# Patient Record
Sex: FEMALE | Race: ASIAN | NOT HISPANIC OR LATINO | URBAN - METROPOLITAN AREA
[De-identification: names, ages, dates, MRNs, and addresses within clinical notes are randomized per-mention and may not be internally consistent; named-entity substitution may affect disease eponyms.]

---

## 2017-08-01 ENCOUNTER — OUTPATIENT (OUTPATIENT)
Dept: OUTPATIENT SERVICES | Facility: HOSPITAL | Age: 57
LOS: 1 days | End: 2017-08-01

## 2017-08-29 ENCOUNTER — EMERGENCY (EMERGENCY)
Facility: HOSPITAL | Age: 57
LOS: 1 days | Discharge: HOME | End: 2017-08-29
Attending: EMERGENCY MEDICINE | Admitting: EMERGENCY MEDICINE
Payer: MEDICAID

## 2017-08-29 VITALS
HEART RATE: 84 BPM | OXYGEN SATURATION: 21 % | TEMPERATURE: 98 F | DIASTOLIC BLOOD PRESSURE: 56 MMHG | SYSTOLIC BLOOD PRESSURE: 146 MMHG

## 2017-08-29 VITALS
TEMPERATURE: 98 F | RESPIRATION RATE: 18 BRPM | SYSTOLIC BLOOD PRESSURE: 125 MMHG | HEART RATE: 94 BPM | OXYGEN SATURATION: 98 % | DIASTOLIC BLOOD PRESSURE: 75 MMHG

## 2017-08-29 LAB
ALBUMIN SERPL ELPH-MCNC: 4.2 G/DL — SIGNIFICANT CHANGE UP (ref 3.3–5)
ALP SERPL-CCNC: 93 U/L — SIGNIFICANT CHANGE UP (ref 40–120)
ALT FLD-CCNC: 19 U/L — SIGNIFICANT CHANGE UP (ref 4–33)
APPEARANCE UR: CLEAR — SIGNIFICANT CHANGE UP
AST SERPL-CCNC: 18 U/L — SIGNIFICANT CHANGE UP (ref 4–32)
BACTERIA # UR AUTO: SIGNIFICANT CHANGE UP
BASOPHILS # BLD AUTO: 0.06 K/UL — SIGNIFICANT CHANGE UP (ref 0–0.2)
BASOPHILS NFR BLD AUTO: 0.8 % — SIGNIFICANT CHANGE UP (ref 0–2)
BILIRUB SERPL-MCNC: 0.3 MG/DL — SIGNIFICANT CHANGE UP (ref 0.2–1.2)
BILIRUB UR-MCNC: NEGATIVE — SIGNIFICANT CHANGE UP
BLOOD UR QL VISUAL: NEGATIVE — SIGNIFICANT CHANGE UP
BUN SERPL-MCNC: 13 MG/DL — SIGNIFICANT CHANGE UP (ref 7–23)
CALCIUM SERPL-MCNC: 10.1 MG/DL — SIGNIFICANT CHANGE UP (ref 8.4–10.5)
CHLORIDE SERPL-SCNC: 99 MMOL/L — SIGNIFICANT CHANGE UP (ref 98–107)
CO2 SERPL-SCNC: 25 MMOL/L — SIGNIFICANT CHANGE UP (ref 22–31)
COLOR SPEC: SIGNIFICANT CHANGE UP
CREAT SERPL-MCNC: 0.9 MG/DL — SIGNIFICANT CHANGE UP (ref 0.5–1.3)
EOSINOPHIL # BLD AUTO: 0.11 K/UL — SIGNIFICANT CHANGE UP (ref 0–0.5)
EOSINOPHIL NFR BLD AUTO: 1.5 % — SIGNIFICANT CHANGE UP (ref 0–6)
GLUCOSE SERPL-MCNC: 145 MG/DL — HIGH (ref 70–99)
GLUCOSE UR-MCNC: NEGATIVE — SIGNIFICANT CHANGE UP
HCT VFR BLD CALC: 42.2 % — SIGNIFICANT CHANGE UP (ref 34.5–45)
HGB BLD-MCNC: 13.2 G/DL — SIGNIFICANT CHANGE UP (ref 11.5–15.5)
HYALINE CASTS # UR AUTO: SIGNIFICANT CHANGE UP (ref 0–?)
IMM GRANULOCYTES # BLD AUTO: 0.02 # — SIGNIFICANT CHANGE UP
IMM GRANULOCYTES NFR BLD AUTO: 0.3 % — SIGNIFICANT CHANGE UP (ref 0–1.5)
KETONES UR-MCNC: NEGATIVE — SIGNIFICANT CHANGE UP
LEUKOCYTE ESTERASE UR-ACNC: HIGH
LIDOCAIN IGE QN: 22.5 U/L — SIGNIFICANT CHANGE UP (ref 7–60)
LYMPHOCYTES # BLD AUTO: 1.65 K/UL — SIGNIFICANT CHANGE UP (ref 1–3.3)
LYMPHOCYTES # BLD AUTO: 22.3 % — SIGNIFICANT CHANGE UP (ref 13–44)
MCHC RBC-ENTMCNC: 26.4 PG — LOW (ref 27–34)
MCHC RBC-ENTMCNC: 31.3 % — LOW (ref 32–36)
MCV RBC AUTO: 84.4 FL — SIGNIFICANT CHANGE UP (ref 80–100)
MONOCYTES # BLD AUTO: 0.45 K/UL — SIGNIFICANT CHANGE UP (ref 0–0.9)
MONOCYTES NFR BLD AUTO: 6.1 % — SIGNIFICANT CHANGE UP (ref 2–14)
MUCOUS THREADS # UR AUTO: SIGNIFICANT CHANGE UP
NEUTROPHILS # BLD AUTO: 5.12 K/UL — SIGNIFICANT CHANGE UP (ref 1.8–7.4)
NEUTROPHILS NFR BLD AUTO: 69 % — SIGNIFICANT CHANGE UP (ref 43–77)
NITRITE UR-MCNC: NEGATIVE — SIGNIFICANT CHANGE UP
NRBC # FLD: 0 — SIGNIFICANT CHANGE UP
PH UR: 6.5 — SIGNIFICANT CHANGE UP (ref 4.6–8)
PLATELET # BLD AUTO: 239 K/UL — SIGNIFICANT CHANGE UP (ref 150–400)
PMV BLD: 10.6 FL — SIGNIFICANT CHANGE UP (ref 7–13)
POTASSIUM SERPL-MCNC: 4.4 MMOL/L — SIGNIFICANT CHANGE UP (ref 3.5–5.3)
POTASSIUM SERPL-SCNC: 4.4 MMOL/L — SIGNIFICANT CHANGE UP (ref 3.5–5.3)
PROT SERPL-MCNC: 7.6 G/DL — SIGNIFICANT CHANGE UP (ref 6–8.3)
PROT UR-MCNC: NEGATIVE — SIGNIFICANT CHANGE UP
RBC # BLD: 5 M/UL — SIGNIFICANT CHANGE UP (ref 3.8–5.2)
RBC # FLD: 15.6 % — HIGH (ref 10.3–14.5)
RBC CASTS # UR COMP ASSIST: SIGNIFICANT CHANGE UP (ref 0–?)
SODIUM SERPL-SCNC: 140 MMOL/L — SIGNIFICANT CHANGE UP (ref 135–145)
SP GR SPEC: 1.01 — SIGNIFICANT CHANGE UP (ref 1–1.03)
SQUAMOUS # UR AUTO: SIGNIFICANT CHANGE UP
UROBILINOGEN FLD QL: NORMAL E.U. — SIGNIFICANT CHANGE UP (ref 0.1–0.2)
WBC # BLD: 7.41 K/UL — SIGNIFICANT CHANGE UP (ref 3.8–10.5)
WBC # FLD AUTO: 7.41 K/UL — SIGNIFICANT CHANGE UP (ref 3.8–10.5)
WBC UR QL: SIGNIFICANT CHANGE UP (ref 0–?)

## 2017-08-29 PROCEDURE — 99283 EMERGENCY DEPT VISIT LOW MDM: CPT | Mod: 25

## 2017-08-29 PROCEDURE — 72100 X-RAY EXAM L-S SPINE 2/3 VWS: CPT | Mod: 26

## 2017-08-29 RX ORDER — SODIUM CHLORIDE 9 MG/ML
1000 INJECTION INTRAMUSCULAR; INTRAVENOUS; SUBCUTANEOUS ONCE
Qty: 0 | Refills: 0 | Status: COMPLETED | OUTPATIENT
Start: 2017-08-29 | End: 2017-08-29

## 2017-08-29 RX ORDER — CEPHALEXIN 500 MG
1 CAPSULE ORAL
Qty: 14 | Refills: 0 | OUTPATIENT
Start: 2017-08-29 | End: 2017-09-05

## 2017-08-29 RX ORDER — METOCLOPRAMIDE HCL 10 MG
10 TABLET ORAL ONCE
Qty: 0 | Refills: 0 | Status: COMPLETED | OUTPATIENT
Start: 2017-08-29 | End: 2017-08-29

## 2017-08-29 RX ORDER — ACETAMINOPHEN 500 MG
650 TABLET ORAL ONCE
Qty: 0 | Refills: 0 | Status: COMPLETED | OUTPATIENT
Start: 2017-08-29 | End: 2017-08-29

## 2017-08-29 RX ORDER — KETOROLAC TROMETHAMINE 30 MG/ML
15 SYRINGE (ML) INJECTION ONCE
Qty: 0 | Refills: 0 | Status: DISCONTINUED | OUTPATIENT
Start: 2017-08-29 | End: 2017-08-29

## 2017-08-29 RX ADMIN — SODIUM CHLORIDE 1000 MILLILITER(S): 9 INJECTION INTRAMUSCULAR; INTRAVENOUS; SUBCUTANEOUS at 07:28

## 2017-08-29 RX ADMIN — Medication 650 MILLIGRAM(S): at 10:59

## 2017-08-29 RX ADMIN — Medication 15 MILLIGRAM(S): at 07:28

## 2017-08-29 RX ADMIN — Medication 10 MILLIGRAM(S): at 07:28

## 2017-08-29 NOTE — ED PROVIDER NOTE - PROGRESS NOTE DETAILS
Patient has had improvement in both headache and back pain. Indicates still having mild HA. Will treat with one more dose of Tylenol before discharging. Urine positive for UTI. Will treat as outpatient. Patient to follow up with PCP and PT.

## 2017-08-29 NOTE — ED PROVIDER NOTE - CHIEF COMPLAINT
The patient is a 57y Female complaining of multiple medical complaints. The patient is a 57y Female complaining of headache and back pain.

## 2017-08-29 NOTE — ED ADULT NURSE NOTE - OBJECTIVE STATEMENT
alert   c/o frontal headache and low back pain   x 3 days   states that back pain is making it difficult to breath    denies any injury to back and no prior back pain

## 2017-08-29 NOTE — ED PROVIDER NOTE - CARE PLAN
Principal Discharge DX:	Headache  Instructions for follow-up, activity and diet:	Please take your antibiotics to treat the UTI as discussed. Please take Tylenol and/or Motrin for back pain and headache. Take no more than recommended on the bottle. Please follow up as soon as possible with your primary care doctor and a physical therapist. Please return if having worsening headache or severe back pain, or for any neurological symptoms such as dizziness, weakness, or changes in vision.  Secondary Diagnosis:	Back pain  Secondary Diagnosis:	UTI (urinary tract infection)

## 2017-08-29 NOTE — ED PROVIDER NOTE - OBJECTIVE STATEMENT
58 y/o F with h/o HTN, DM, hypothyroid presents with complaint of headache and lower back pain. Headache began 3 days ago and was gradual in onset. Minimal relieve with tylenol. Denies any neck pain/stiffness. Tmax of 100F at home. Has not taken any medications today. Headache is bilateral and associated with nausea. Had one episode of vomiting today. Denies any visual changes, focal deficits. Lower back pain has been going on for approximately the same amount of time. Patient's son is getting  this week and patient has been working hard cleaning around the house.

## 2017-08-29 NOTE — ED PROVIDER NOTE - MEDICAL DECISION MAKING DETAILS
56 y/o F with h/o DM, hypothyroid and HTN presents with complaint of headache and back pain. Increased activity recently secondary to son's wedding. afebrile, neck supple. No meningeal signs. Does have midline spinous process tenderness, will do xray lumbar spine to evaluate

## 2017-08-29 NOTE — ED PROVIDER NOTE - PLAN OF CARE
Please take your antibiotics to treat the UTI as discussed. Please take Tylenol and/or Motrin for back pain and headache. Take no more than recommended on the bottle. Please follow up as soon as possible with your primary care doctor and a physical therapist. Please return if having worsening headache or severe back pain, or for any neurological symptoms such as dizziness, weakness, or changes in vision.

## 2017-08-29 NOTE — ED PROVIDER NOTE - SHIFT CHANGE DETAILS
[] Labs, reassess after medications  [] If Sx improved f/u PCP/ortho outp for HA and LBP management  STEFAN

## 2017-08-29 NOTE — ED PROVIDER NOTE - ATTENDING CONTRIBUTION TO CARE
I was physically present for the E/M service provided. I agree with above history, physical, and plan which I have reviewed and edited where appropriate. I was physically present for the key portions of the service provided.    57F h/o DM, HTN, hypothyroid p/w 3 days of gradual onset constant headache. Subjective fever at home today. Also LBP x3 days without LE weakness no bowel or bladder incontinence. No URI Sx. No vision changes. No focal deficits. No photophobia. +Nausea. No relief with Tylenol.  -NAD, Neurologic exam: A&O x3, speech clear, CELI, CN II-XII intact, motor strength +5/5 in all extremities, sensation equal bilaterally, finger-to-nose normal, neck supple  -IVF, reglan, Toradol reassess I was physically present for the E/M service provided. I agree with above history, physical, and plan which I have reviewed and edited where appropriate. I was physically present for the key portions of the service provided.    57F h/o DM, HTN, hypothyroid p/w 3 days of gradual onset constant headache. Subjective fever at home today, none in ED. Also LBP x3 days without LE weakness no bowel or bladder incontinence. No URI Sx. No vision changes. No focal deficits. No photophobia. +Nausea. No relief with Tylenol. Pt has been hosting a 3 day wedding celebration for her son and has been working extremely hard per family.  -NAD, Neurologic exam: A&O x3, speech clear, CELI, CN II-XII intact, motor strength +5/5 in all extremities, sensation equal bilaterally, finger-to-nose normal, neck supple, abd soft NTND  -HA: IVF, Reglan, Toradol, reassess HA Sx, no focal deficit  -LBP: XR L-spine neg for acute process, no cauda equina warning sign son exam, will need PT f/u outpt

## 2017-08-30 LAB — SPECIMEN SOURCE: SIGNIFICANT CHANGE UP

## 2017-08-31 DIAGNOSIS — R69 ILLNESS, UNSPECIFIED: ICD-10-CM

## 2017-08-31 LAB — BACTERIA UR CULT: SIGNIFICANT CHANGE UP

## 2018-12-17 PROBLEM — E11.9 TYPE 2 DIABETES MELLITUS WITHOUT COMPLICATIONS: Chronic | Status: ACTIVE | Noted: 2017-08-29

## 2018-12-17 PROBLEM — I10 ESSENTIAL (PRIMARY) HYPERTENSION: Chronic | Status: ACTIVE | Noted: 2017-08-29

## 2018-12-17 PROBLEM — E03.9 HYPOTHYROIDISM, UNSPECIFIED: Chronic | Status: ACTIVE | Noted: 2017-08-29

## 2018-12-27 ENCOUNTER — OUTPATIENT (OUTPATIENT)
Dept: OUTPATIENT SERVICES | Facility: HOSPITAL | Age: 58
LOS: 1 days | End: 2018-12-27
Payer: MEDICAID

## 2018-12-27 VITALS
HEIGHT: 64 IN | DIASTOLIC BLOOD PRESSURE: 74 MMHG | TEMPERATURE: 98 F | OXYGEN SATURATION: 96 % | SYSTOLIC BLOOD PRESSURE: 131 MMHG | RESPIRATION RATE: 16 BRPM | WEIGHT: 199.96 LBS | HEART RATE: 66 BPM

## 2018-12-27 DIAGNOSIS — R94.39 ABNORMAL RESULT OF OTHER CARDIOVASCULAR FUNCTION STUDY: ICD-10-CM

## 2018-12-27 DIAGNOSIS — Z98.890 OTHER SPECIFIED POSTPROCEDURAL STATES: Chronic | ICD-10-CM

## 2018-12-27 LAB
ALBUMIN SERPL ELPH-MCNC: 4.4 G/DL — SIGNIFICANT CHANGE UP (ref 3.3–5)
ALP SERPL-CCNC: 102 U/L — SIGNIFICANT CHANGE UP (ref 40–120)
ALT FLD-CCNC: 16 U/L — SIGNIFICANT CHANGE UP (ref 10–45)
ANION GAP SERPL CALC-SCNC: 11 MMOL/L — SIGNIFICANT CHANGE UP (ref 5–17)
AST SERPL-CCNC: 14 U/L — SIGNIFICANT CHANGE UP (ref 10–40)
BILIRUB SERPL-MCNC: 0.3 MG/DL — SIGNIFICANT CHANGE UP (ref 0.2–1.2)
BUN SERPL-MCNC: 13 MG/DL — SIGNIFICANT CHANGE UP (ref 7–23)
CALCIUM SERPL-MCNC: 9.7 MG/DL — SIGNIFICANT CHANGE UP (ref 8.4–10.5)
CHLORIDE SERPL-SCNC: 103 MMOL/L — SIGNIFICANT CHANGE UP (ref 96–108)
CO2 SERPL-SCNC: 26 MMOL/L — SIGNIFICANT CHANGE UP (ref 22–31)
CREAT SERPL-MCNC: 0.81 MG/DL — SIGNIFICANT CHANGE UP (ref 0.5–1.3)
GLUCOSE SERPL-MCNC: 139 MG/DL — HIGH (ref 70–99)
HCT VFR BLD CALC: 38.1 % — SIGNIFICANT CHANGE UP (ref 34.5–45)
HGB BLD-MCNC: 12.6 G/DL — SIGNIFICANT CHANGE UP (ref 11.5–15.5)
MCHC RBC-ENTMCNC: 27.3 PG — SIGNIFICANT CHANGE UP (ref 27–34)
MCHC RBC-ENTMCNC: 33.1 GM/DL — SIGNIFICANT CHANGE UP (ref 32–36)
MCV RBC AUTO: 82.4 FL — SIGNIFICANT CHANGE UP (ref 80–100)
PLATELET # BLD AUTO: 234 K/UL — SIGNIFICANT CHANGE UP (ref 150–400)
POTASSIUM SERPL-MCNC: 4.4 MMOL/L — SIGNIFICANT CHANGE UP (ref 3.5–5.3)
POTASSIUM SERPL-SCNC: 4.4 MMOL/L — SIGNIFICANT CHANGE UP (ref 3.5–5.3)
PROT SERPL-MCNC: 7.4 G/DL — SIGNIFICANT CHANGE UP (ref 6–8.3)
RBC # BLD: 4.62 M/UL — SIGNIFICANT CHANGE UP (ref 3.8–5.2)
RBC # FLD: 13.8 % — SIGNIFICANT CHANGE UP (ref 10.3–14.5)
SODIUM SERPL-SCNC: 140 MMOL/L — SIGNIFICANT CHANGE UP (ref 135–145)
WBC # BLD: 6.8 K/UL — SIGNIFICANT CHANGE UP (ref 3.8–10.5)
WBC # FLD AUTO: 6.8 K/UL — SIGNIFICANT CHANGE UP (ref 3.8–10.5)

## 2018-12-27 PROCEDURE — C1894: CPT

## 2018-12-27 PROCEDURE — 85027 COMPLETE CBC AUTOMATED: CPT

## 2018-12-27 PROCEDURE — 93458 L HRT ARTERY/VENTRICLE ANGIO: CPT

## 2018-12-27 PROCEDURE — 93005 ELECTROCARDIOGRAM TRACING: CPT

## 2018-12-27 PROCEDURE — 99152 MOD SED SAME PHYS/QHP 5/>YRS: CPT

## 2018-12-27 PROCEDURE — C1769: CPT

## 2018-12-27 PROCEDURE — 99203 OFFICE O/P NEW LOW 30 MIN: CPT

## 2018-12-27 PROCEDURE — 93458 L HRT ARTERY/VENTRICLE ANGIO: CPT | Mod: 26,GC

## 2018-12-27 PROCEDURE — C1887: CPT

## 2018-12-27 PROCEDURE — 80053 COMPREHEN METABOLIC PANEL: CPT

## 2018-12-27 PROCEDURE — 99152 MOD SED SAME PHYS/QHP 5/>YRS: CPT | Mod: GC

## 2018-12-27 PROCEDURE — 93010 ELECTROCARDIOGRAM REPORT: CPT

## 2018-12-27 RX ORDER — SODIUM CHLORIDE 9 MG/ML
3 INJECTION INTRAMUSCULAR; INTRAVENOUS; SUBCUTANEOUS EVERY 8 HOURS
Qty: 0 | Refills: 0 | Status: DISCONTINUED | OUTPATIENT
Start: 2018-12-27 | End: 2019-01-11

## 2018-12-27 NOTE — H&P CARDIOLOGY - HISTORY OF PRESENT ILLNESS
58 year old [place] female with PMH of 58 year old [place] female with PMH of non occlusive carotid artery disease,  HTN, HLD, hypothyroidism, DMT2-well controlled, without complication and followed by Dr Sarkar PCP, and obesity presents today for cardiac  catheterization. Patient reports she went for routine check up and was referred to Dr Shaik White who performed NST revealing inferoseptal ischemia. EF 78%. Patient denies CP or DAVILA.  Referred today for angiogram.

## 2018-12-27 NOTE — H&P CARDIOLOGY - PMH
Diabetes mellitus    HLD (hyperlipidemia)    Hypertension    Hypothyroid Carotid stenosis    Diabetes mellitus    HLD (hyperlipidemia)    Hypertension    Hypothyroid

## 2021-04-22 ENCOUNTER — EMERGENCY (EMERGENCY)
Facility: HOSPITAL | Age: 61
LOS: 1 days | Discharge: HOME | End: 2021-04-22
Attending: EMERGENCY MEDICINE | Admitting: EMERGENCY MEDICINE
Payer: MEDICAID

## 2021-04-22 VITALS
RESPIRATION RATE: 16 BRPM | HEART RATE: 82 BPM | SYSTOLIC BLOOD PRESSURE: 156 MMHG | TEMPERATURE: 97 F | DIASTOLIC BLOOD PRESSURE: 80 MMHG | OXYGEN SATURATION: 98 %

## 2021-04-22 DIAGNOSIS — Z98.890 OTHER SPECIFIED POSTPROCEDURAL STATES: Chronic | ICD-10-CM

## 2021-04-22 LAB
ALBUMIN SERPL ELPH-MCNC: 3.7 G/DL — SIGNIFICANT CHANGE UP (ref 3.3–5)
ALP SERPL-CCNC: 107 U/L — SIGNIFICANT CHANGE UP (ref 40–120)
ALT FLD-CCNC: 20 U/L — SIGNIFICANT CHANGE UP (ref 12–78)
ANION GAP SERPL CALC-SCNC: 7 MMOL/L — SIGNIFICANT CHANGE UP (ref 5–17)
APTT BLD: 30.5 SEC — SIGNIFICANT CHANGE UP (ref 27.5–35.5)
AST SERPL-CCNC: 12 U/L — LOW (ref 15–37)
BASOPHILS # BLD AUTO: 0.07 K/UL — SIGNIFICANT CHANGE UP (ref 0–0.2)
BASOPHILS NFR BLD AUTO: 0.7 % — SIGNIFICANT CHANGE UP (ref 0–2)
BILIRUB SERPL-MCNC: 0.3 MG/DL — SIGNIFICANT CHANGE UP (ref 0.2–1.2)
BUN SERPL-MCNC: 19 MG/DL — SIGNIFICANT CHANGE UP (ref 7–23)
CALCIUM SERPL-MCNC: 9.4 MG/DL — SIGNIFICANT CHANGE UP (ref 8.5–10.1)
CHLORIDE SERPL-SCNC: 105 MMOL/L — SIGNIFICANT CHANGE UP (ref 96–108)
CO2 SERPL-SCNC: 28 MMOL/L — SIGNIFICANT CHANGE UP (ref 22–31)
CREAT SERPL-MCNC: 1 MG/DL — SIGNIFICANT CHANGE UP (ref 0.5–1.3)
EOSINOPHIL # BLD AUTO: 0.25 K/UL — SIGNIFICANT CHANGE UP (ref 0–0.5)
EOSINOPHIL NFR BLD AUTO: 2.6 % — SIGNIFICANT CHANGE UP (ref 0–6)
GLUCOSE SERPL-MCNC: 140 MG/DL — HIGH (ref 70–99)
HCT VFR BLD CALC: 41.3 % — SIGNIFICANT CHANGE UP (ref 34.5–45)
HGB BLD-MCNC: 12.7 G/DL — SIGNIFICANT CHANGE UP (ref 11.5–15.5)
IMM GRANULOCYTES NFR BLD AUTO: 0.4 % — SIGNIFICANT CHANGE UP (ref 0–1.5)
INR BLD: 1.13 RATIO — SIGNIFICANT CHANGE UP (ref 0.88–1.16)
LYMPHOCYTES # BLD AUTO: 2.73 K/UL — SIGNIFICANT CHANGE UP (ref 1–3.3)
LYMPHOCYTES # BLD AUTO: 28.7 % — SIGNIFICANT CHANGE UP (ref 13–44)
MCHC RBC-ENTMCNC: 23.8 PG — LOW (ref 27–34)
MCHC RBC-ENTMCNC: 30.8 GM/DL — LOW (ref 32–36)
MCV RBC AUTO: 77.5 FL — LOW (ref 80–100)
MONOCYTES # BLD AUTO: 0.61 K/UL — SIGNIFICANT CHANGE UP (ref 0–0.9)
MONOCYTES NFR BLD AUTO: 6.4 % — SIGNIFICANT CHANGE UP (ref 2–14)
NEUTROPHILS # BLD AUTO: 5.8 K/UL — SIGNIFICANT CHANGE UP (ref 1.8–7.4)
NEUTROPHILS NFR BLD AUTO: 61.2 % — SIGNIFICANT CHANGE UP (ref 43–77)
NRBC # BLD: 0 /100 WBCS — SIGNIFICANT CHANGE UP (ref 0–0)
PLATELET # BLD AUTO: 258 K/UL — SIGNIFICANT CHANGE UP (ref 150–400)
POTASSIUM SERPL-MCNC: 4.3 MMOL/L — SIGNIFICANT CHANGE UP (ref 3.5–5.3)
POTASSIUM SERPL-SCNC: 4.3 MMOL/L — SIGNIFICANT CHANGE UP (ref 3.5–5.3)
PROT SERPL-MCNC: 7.9 G/DL — SIGNIFICANT CHANGE UP (ref 6–8.3)
PROTHROM AB SERPL-ACNC: 13.1 SEC — SIGNIFICANT CHANGE UP (ref 10.6–13.6)
RBC # BLD: 5.33 M/UL — HIGH (ref 3.8–5.2)
RBC # FLD: 19.3 % — HIGH (ref 10.3–14.5)
SODIUM SERPL-SCNC: 140 MMOL/L — SIGNIFICANT CHANGE UP (ref 135–145)
TROPONIN I SERPL-MCNC: <.015 NG/ML — SIGNIFICANT CHANGE UP (ref 0.01–0.04)
WBC # BLD: 9.5 K/UL — SIGNIFICANT CHANGE UP (ref 3.8–10.5)
WBC # FLD AUTO: 9.5 K/UL — SIGNIFICANT CHANGE UP (ref 3.8–10.5)

## 2021-04-22 PROCEDURE — 99285 EMERGENCY DEPT VISIT HI MDM: CPT

## 2021-04-22 PROCEDURE — 93010 ELECTROCARDIOGRAM REPORT: CPT

## 2021-04-22 PROCEDURE — 70450 CT HEAD/BRAIN W/O DYE: CPT | Mod: 26,MA

## 2021-04-22 PROCEDURE — 71045 X-RAY EXAM CHEST 1 VIEW: CPT | Mod: 26

## 2021-04-22 RX ORDER — SODIUM CHLORIDE 9 MG/ML
1000 INJECTION INTRAMUSCULAR; INTRAVENOUS; SUBCUTANEOUS ONCE
Refills: 0 | Status: COMPLETED | OUTPATIENT
Start: 2021-04-22 | End: 2021-04-22

## 2021-04-22 RX ADMIN — SODIUM CHLORIDE 1000 MILLILITER(S): 9 INJECTION INTRAMUSCULAR; INTRAVENOUS; SUBCUTANEOUS at 23:59

## 2021-04-22 RX ADMIN — SODIUM CHLORIDE 1000 MILLILITER(S): 9 INJECTION INTRAMUSCULAR; INTRAVENOUS; SUBCUTANEOUS at 22:19

## 2021-04-22 NOTE — ED PROVIDER NOTE - ATTENDING CONTRIBUTION TO CARE
59yo female bib ems s/p syncopal episode, pt was fasting but ate, and got dizzy and lightheaded and passed out hit her head, no chest pain no other complaints  exam: neuro intact, lungs CTA, heart RRR  plan: ekg, labs, xr, ct  agree with assessment and plan of PA

## 2021-04-22 NOTE — ED ADULT NURSE NOTE - NSIMPLEMENTINTERV_GEN_ALL_ED
Implemented All Fall Risk Interventions:  [place] to call system. Call bell, personal items and telephone within reach. Instruct patient to call for assistance. Room bathroom lighting operational. Non-slip footwear when patient is off stretcher. Physically safe environment: no spills, clutter or unnecessary equipment. Stretcher in lowest position, wheels locked, appropriate side rails in place. Provide visual cue, wrist band, yellow gown, etc. Monitor gait and stability. Monitor for mental status changes and reorient to person, place, and time. Review medications for side effects contributing to fall risk. Reinforce activity limits and safety measures with patient and family.

## 2021-04-22 NOTE — ED PROVIDER NOTE - PROGRESS NOTE DETAILS
lab no acute findings lab no acute findings ct head wnl pt feeling better normal gait advised f/u with pcp and cardio stable for d/c

## 2021-04-22 NOTE — ED ADULT NURSE NOTE - HOW OFTEN DO YOU HAVE A DRINK CONTAINING ALCOHOL?
Hpi Title: Evaluation of Skin Lesions
How Severe Are Your Spot(S)?: mild
Have Your Spot(S) Been Treated In The Past?: has not been treated
Never

## 2021-04-22 NOTE — ED PROVIDER NOTE - CARE PROVIDER_API CALL
Joey Caputo)  Cardiovascular Disease; Internal Medicine  [address], [place] [id number]  Phone: [phone]  Fax: [phone]  Follow Up Time:     Leyla Shepherd)  Internal Medicine  [address] [place] [address]  Phone: [phone]  Fax: [phone]  Follow Up Time:

## 2021-04-22 NOTE — ED ADULT NURSE NOTE - BREATH SOUNDS, LEFT
[FreeTextEntry1] : This note was authored by Amaya Tejeda working as scribe for Dr. Nolan Lay. I, Dr. Nolan Lay, have reviewed the content of this note and confirm it is true and accurate. I personally performed the history and physical examination and made all the decisions.\par 07/14/2020 
clear

## 2021-04-22 NOTE — ED PROVIDER NOTE - PATIENT PORTAL LINK FT
You can access the FollowMyHealth Patient Portal offered by [facility] by registering at the following website: [email or web address]. By joining [facility]’s FollowMyHealth portal, you will also be able to view your health information using other applications (apps) compatible with our system.

## 2021-04-22 NOTE — ED PROVIDER NOTE - OBJECTIVE STATEMENT
Pt is a 59 yo female with pmhx of DM HTN HLD BIBEMS for syncope. Pt states she is on 10th day of fasting, she broke her fast today took her regular medications. Pt was feeding her grand [place] started to feel dizzy and see black. Pt's son found pt floor no chest pain no sob no headache. Pt does not recall what happened. Pt denies any numbness tingling weakness fever chills cough. Pt c/o of generalized weakness and feeling tired.     pcp Leyla Marin

## 2021-04-22 NOTE — ED PROVIDER NOTE - NSFOLLOWUPINSTRUCTIONS_ED_ALL_ED_FT
Please follow up with pcp and cardiologist   return to er for any worsening symptoms     Syncope    Syncope is when you pass out (faint) for a short time. It is caused by a sudden decrease in blood flow to the brain. Signs that you may be about to pass out include:  •Feeling dizzy or light-headed.      •Feeling sick to your stomach (nauseous).      •Seeing all white or all black.      •Having cold, clammy skin.      If you pass out, get help right away. Call your local emergency services (911 in the [place] Do not drive yourself to the hospital.      Follow these instructions at home:    Watch for any changes in your symptoms. Take these actions to stay safe and help with your symptoms:    Lifestyle     • Do not drive, use machinery, or play sports until your doctor says it is okay.      • Do not drink alcohol.      • Do not use any products that contain nicotine or tobacco, such as cigarettes and e-cigarettes. If you need help quitting, ask your doctor.      •Drink enough fluid to keep your pee (urine) pale yellow.      General instructions     •Take over-the-counter and prescription medicines only as told by your doctor.      •If you are taking blood pressure or heart medicine, sit up and stand up slowly. Spend a few minutes getting ready to sit and then stand. This can help you feel less dizzy.      •Have someone stay with you until you feel stable.      •If you start to feel like you might pass out, lie down right away and raise (elevate) your feet above the level of your heart. Breathe deeply and steadily. Wait until all of the symptoms are gone.      •Keep all follow-up visits as told by your doctor. This is important.        Get help right away if:    •You have a very bad headache.      •You pass out once or more than once.      •You have pain in your chest, belly, or back.      •You have a very fast or uneven heartbeat (palpitations).      •It hurts to breathe.      •You are bleeding from your mouth or your bottom (rectum).      •You have black or tarry poop (stool).      •You have jerky movements that you cannot control (seizure).      •You are confused.      •You have trouble walking.      •You are very weak.      •You have vision problems.      These symptoms may be an emergency. Do not wait to see if the symptoms will go away. Get medical help right away. Call your local emergency services (911 in the [place] Do not drive yourself to the hospital.       Summary    •Syncope is when you pass out (faint) for a short time. It is caused by a sudden decrease in blood flow to the brain.      •Signs that you may be about to faint include feeling dizzy, light-headed, or sick to your stomach, seeing all white or all black, or having cold, clammy skin.      •If you start to feel like you might pass out, lie down right away and raise (elevate) your feet above the level of your heart. Breathe deeply and steadily. Wait until all of the symptoms are gone.      This information is not intended to replace advice given to you by your health care provider. Make sure you discuss any questions you have with your health care provider.

## 2021-04-22 NOTE — ED ADULT NURSE NOTE - OBJECTIVE STATEMENT
patient a/o x 4 with a calm affect states she felt dizzy at home and had a syncopal episode, witnessed by daughter in law.  patient denies chest pain or even generalized pain from a fall.  patient denies dizziness now, but feels weak.  pending initial labs, EKG completed, pending CT

## 2021-04-23 VITALS
OXYGEN SATURATION: 99 % | TEMPERATURE: 98 F | DIASTOLIC BLOOD PRESSURE: 76 MMHG | SYSTOLIC BLOOD PRESSURE: 140 MMHG | HEART RATE: 86 BPM | RESPIRATION RATE: 16 BRPM

## 2021-04-24 ENCOUNTER — INPATIENT (INPATIENT)
Facility: HOSPITAL | Age: 61
LOS: 1 days | Discharge: HOME | DRG: 313 | End: 2021-04-26
Attending: INTERNAL MEDICINE | Admitting: STUDENT IN AN ORGANIZED HEALTH CARE EDUCATION/TRAINING PROGRAM
Payer: MEDICAID

## 2021-04-24 VITALS
WEIGHT: 190.04 LBS | SYSTOLIC BLOOD PRESSURE: 123 MMHG | TEMPERATURE: 97 F | HEART RATE: 84 BPM | OXYGEN SATURATION: 98 % | DIASTOLIC BLOOD PRESSURE: 79 MMHG | RESPIRATION RATE: 15 BRPM

## 2021-04-24 DIAGNOSIS — Z98.890 OTHER SPECIFIED POSTPROCEDURAL STATES: Chronic | ICD-10-CM

## 2021-04-24 DIAGNOSIS — R07.89 OTHER CHEST PAIN: ICD-10-CM

## 2021-04-24 DIAGNOSIS — E03.9 HYPOTHYROIDISM, UNSPECIFIED: ICD-10-CM

## 2021-04-24 DIAGNOSIS — N95.9 UNSPECIFIED MENOPAUSAL AND PERIMENOPAUSAL DISORDER: Chronic | ICD-10-CM

## 2021-04-24 DIAGNOSIS — R53.1 WEAKNESS: ICD-10-CM

## 2021-04-24 DIAGNOSIS — E11.9 TYPE 2 DIABETES MELLITUS WITHOUT COMPLICATIONS: ICD-10-CM

## 2021-04-24 DIAGNOSIS — I10 ESSENTIAL (PRIMARY) HYPERTENSION: ICD-10-CM

## 2021-04-24 DIAGNOSIS — R55 SYNCOPE AND COLLAPSE: ICD-10-CM

## 2021-04-24 LAB
ALBUMIN SERPL ELPH-MCNC: 3.5 G/DL — SIGNIFICANT CHANGE UP (ref 3.3–5)
ALP SERPL-CCNC: 103 U/L — SIGNIFICANT CHANGE UP (ref 40–120)
ALT FLD-CCNC: 21 U/L — SIGNIFICANT CHANGE UP (ref 12–78)
ANION GAP SERPL CALC-SCNC: 9 MMOL/L — SIGNIFICANT CHANGE UP (ref 5–17)
AST SERPL-CCNC: 14 U/L — LOW (ref 15–37)
BASOPHILS # BLD AUTO: 0.06 K/UL — SIGNIFICANT CHANGE UP (ref 0–0.2)
BASOPHILS NFR BLD AUTO: 0.7 % — SIGNIFICANT CHANGE UP (ref 0–2)
BILIRUB SERPL-MCNC: 0.2 MG/DL — SIGNIFICANT CHANGE UP (ref 0.2–1.2)
BUN SERPL-MCNC: 16 MG/DL — SIGNIFICANT CHANGE UP (ref 7–23)
CALCIUM SERPL-MCNC: 9 MG/DL — SIGNIFICANT CHANGE UP (ref 8.5–10.1)
CHLORIDE SERPL-SCNC: 106 MMOL/L — SIGNIFICANT CHANGE UP (ref 96–108)
CK MB BLD-MCNC: <2.2 % — SIGNIFICANT CHANGE UP (ref 0–3.5)
CK MB CFR SERPL CALC: <1 NG/ML — SIGNIFICANT CHANGE UP (ref 0–3.6)
CK SERPL-CCNC: 46 U/L — SIGNIFICANT CHANGE UP (ref 26–192)
CO2 SERPL-SCNC: 26 MMOL/L — SIGNIFICANT CHANGE UP (ref 22–31)
CREAT SERPL-MCNC: 0.89 MG/DL — SIGNIFICANT CHANGE UP (ref 0.5–1.3)
D DIMER BLD IA.RAPID-MCNC: <150 NG/ML DDU — SIGNIFICANT CHANGE UP
EOSINOPHIL # BLD AUTO: 0.21 K/UL — SIGNIFICANT CHANGE UP (ref 0–0.5)
EOSINOPHIL NFR BLD AUTO: 2.6 % — SIGNIFICANT CHANGE UP (ref 0–6)
GLUCOSE SERPL-MCNC: 180 MG/DL — HIGH (ref 70–99)
HCT VFR BLD CALC: 39.3 % — SIGNIFICANT CHANGE UP (ref 34.5–45)
HGB BLD-MCNC: 12.1 G/DL — SIGNIFICANT CHANGE UP (ref 11.5–15.5)
IMM GRANULOCYTES NFR BLD AUTO: 0.5 % — SIGNIFICANT CHANGE UP (ref 0–1.5)
LYMPHOCYTES # BLD AUTO: 2.43 K/UL — SIGNIFICANT CHANGE UP (ref 1–3.3)
LYMPHOCYTES # BLD AUTO: 29.6 % — SIGNIFICANT CHANGE UP (ref 13–44)
MAGNESIUM SERPL-MCNC: 1.9 MG/DL — SIGNIFICANT CHANGE UP (ref 1.6–2.6)
MCHC RBC-ENTMCNC: 23.7 PG — LOW (ref 27–34)
MCHC RBC-ENTMCNC: 30.8 GM/DL — LOW (ref 32–36)
MCV RBC AUTO: 77.1 FL — LOW (ref 80–100)
MONOCYTES # BLD AUTO: 0.46 K/UL — SIGNIFICANT CHANGE UP (ref 0–0.9)
MONOCYTES NFR BLD AUTO: 5.6 % — SIGNIFICANT CHANGE UP (ref 2–14)
NEUTROPHILS # BLD AUTO: 5 K/UL — SIGNIFICANT CHANGE UP (ref 1.8–7.4)
NEUTROPHILS NFR BLD AUTO: 61 % — SIGNIFICANT CHANGE UP (ref 43–77)
NRBC # BLD: 0 /100 WBCS — SIGNIFICANT CHANGE UP (ref 0–0)
NT-PROBNP SERPL-SCNC: 36 PG/ML — SIGNIFICANT CHANGE UP (ref 0–125)
PLATELET # BLD AUTO: 255 K/UL — SIGNIFICANT CHANGE UP (ref 150–400)
POTASSIUM SERPL-MCNC: 4.1 MMOL/L — SIGNIFICANT CHANGE UP (ref 3.5–5.3)
POTASSIUM SERPL-SCNC: 4.1 MMOL/L — SIGNIFICANT CHANGE UP (ref 3.5–5.3)
PROT SERPL-MCNC: 7.6 G/DL — SIGNIFICANT CHANGE UP (ref 6–8.3)
RBC # BLD: 5.1 M/UL — SIGNIFICANT CHANGE UP (ref 3.8–5.2)
RBC # FLD: 19.2 % — HIGH (ref 10.3–14.5)
SARS-COV-2 RNA SPEC QL NAA+PROBE: SIGNIFICANT CHANGE UP
SODIUM SERPL-SCNC: 141 MMOL/L — SIGNIFICANT CHANGE UP (ref 135–145)
TROPONIN I SERPL-MCNC: <.015 NG/ML — SIGNIFICANT CHANGE UP (ref 0.01–0.04)
TROPONIN I SERPL-MCNC: <.015 NG/ML — SIGNIFICANT CHANGE UP (ref 0.01–0.04)
WBC # BLD: 8.2 K/UL — SIGNIFICANT CHANGE UP (ref 3.8–10.5)
WBC # FLD AUTO: 8.2 K/UL — SIGNIFICANT CHANGE UP (ref 3.8–10.5)

## 2021-04-24 PROCEDURE — 71045 X-RAY EXAM CHEST 1 VIEW: CPT | Mod: 26

## 2021-04-24 PROCEDURE — 99285 EMERGENCY DEPT VISIT HI MDM: CPT

## 2021-04-24 PROCEDURE — 99223 1ST HOSP IP/OBS HIGH 75: CPT

## 2021-04-24 PROCEDURE — 93306 TTE W/DOPPLER COMPLETE: CPT | Mod: 26

## 2021-04-24 PROCEDURE — 93010 ELECTROCARDIOGRAM REPORT: CPT

## 2021-04-24 PROCEDURE — 71275 CT ANGIOGRAPHY CHEST: CPT | Mod: 26

## 2021-04-24 RX ORDER — LEVOTHYROXINE SODIUM 125 MCG
112 TABLET ORAL DAILY
Refills: 0 | Status: DISCONTINUED | OUTPATIENT
Start: 2021-04-25 | End: 2021-04-26

## 2021-04-24 RX ORDER — GLUCAGON INJECTION, SOLUTION 0.5 MG/.1ML
1 INJECTION, SOLUTION SUBCUTANEOUS ONCE
Refills: 0 | Status: DISCONTINUED | OUTPATIENT
Start: 2021-04-24 | End: 2021-04-26

## 2021-04-24 RX ORDER — INSULIN LISPRO 100/ML
VIAL (ML) SUBCUTANEOUS AT BEDTIME
Refills: 0 | Status: DISCONTINUED | OUTPATIENT
Start: 2021-04-24 | End: 2021-04-26

## 2021-04-24 RX ORDER — ATENOLOL 25 MG/1
25 TABLET ORAL DAILY
Refills: 0 | Status: DISCONTINUED | OUTPATIENT
Start: 2021-04-25 | End: 2021-04-26

## 2021-04-24 RX ORDER — DEXTROSE 50 % IN WATER 50 %
15 SYRINGE (ML) INTRAVENOUS ONCE
Refills: 0 | Status: DISCONTINUED | OUTPATIENT
Start: 2021-04-24 | End: 2021-04-26

## 2021-04-24 RX ORDER — ASPIRIN/CALCIUM CARB/MAGNESIUM 324 MG
81 TABLET ORAL DAILY
Refills: 0 | Status: DISCONTINUED | OUTPATIENT
Start: 2021-04-24 | End: 2021-04-26

## 2021-04-24 RX ORDER — ENOXAPARIN SODIUM 100 MG/ML
40 INJECTION SUBCUTANEOUS DAILY
Refills: 0 | Status: DISCONTINUED | OUTPATIENT
Start: 2021-04-25 | End: 2021-04-26

## 2021-04-24 RX ORDER — DEXTROSE 50 % IN WATER 50 %
25 SYRINGE (ML) INTRAVENOUS ONCE
Refills: 0 | Status: DISCONTINUED | OUTPATIENT
Start: 2021-04-24 | End: 2021-04-26

## 2021-04-24 RX ORDER — LEVOTHYROXINE SODIUM 125 MCG
25 TABLET ORAL
Refills: 0 | Status: DISCONTINUED | OUTPATIENT
Start: 2021-04-24 | End: 2021-04-26

## 2021-04-24 RX ORDER — ALBUTEROL 90 UG/1
2 AEROSOL, METERED ORAL EVERY 6 HOURS
Refills: 0 | Status: DISCONTINUED | OUTPATIENT
Start: 2021-04-24 | End: 2021-04-26

## 2021-04-24 RX ORDER — SODIUM CHLORIDE 9 MG/ML
1000 INJECTION, SOLUTION INTRAVENOUS
Refills: 0 | Status: DISCONTINUED | OUTPATIENT
Start: 2021-04-24 | End: 2021-04-26

## 2021-04-24 RX ORDER — INSULIN LISPRO 100/ML
VIAL (ML) SUBCUTANEOUS
Refills: 0 | Status: DISCONTINUED | OUTPATIENT
Start: 2021-04-24 | End: 2021-04-26

## 2021-04-24 RX ORDER — SIMVASTATIN 20 MG/1
10 TABLET, FILM COATED ORAL AT BEDTIME
Refills: 0 | Status: DISCONTINUED | OUTPATIENT
Start: 2021-04-24 | End: 2021-04-26

## 2021-04-24 RX ORDER — DEXTROSE 50 % IN WATER 50 %
12.5 SYRINGE (ML) INTRAVENOUS ONCE
Refills: 0 | Status: DISCONTINUED | OUTPATIENT
Start: 2021-04-24 | End: 2021-04-26

## 2021-04-24 RX ADMIN — SIMVASTATIN 10 MILLIGRAM(S): 20 TABLET, FILM COATED ORAL at 22:01

## 2021-04-24 RX ADMIN — SODIUM CHLORIDE 80 MILLILITER(S): 9 INJECTION, SOLUTION INTRAVENOUS at 16:35

## 2021-04-24 NOTE — H&P ADULT - NSHPSOCIALHISTORY_GEN_ALL_CORE
Denies ETOH use, smoking, recreational drug use.  Lives with her son and helps take care of grandchild

## 2021-04-24 NOTE — ED PROVIDER NOTE - CLINICAL SUMMARY MEDICAL DECISION MAKING FREE TEXT BOX
syncopal event 2 days ago. on and off dizziness, chest pressure, and shortness of breath with exertion. differentials include but not limited to ACS, angina, dehydration, electrolyte abnormality, dehydration, anemia, TIA. will check labs, EKG, CXR, cardio consult

## 2021-04-24 NOTE — H&P ADULT - NSICDXPROBLEMPLAN2_GEN_ALL_CORE_FT
F/u labs including A1c, TSH,   PT eval Etiology likley related to fasting for prolonged period (pt undergoing Ramadan fast x past 10days)  F/u labs including A1c, TSH, cbc. cmp, iron panel, vit D  See work  up above  IV fluids  PT eval

## 2021-04-24 NOTE — ED PROVIDER NOTE - OBJECTIVE STATEMENT
60 year old female with history of HLD, HTN, and DM presents with on and off dizziness, weakness, chest pressure, shortness of breath with exertion, and on and off palpitations. reports mild chest pressure and shortness of breath with exertion past 3-4 days (reports not telling anyone so they wouldn't worry). would use albuterol she had and would improve the shortness of breath. was also fasting. 2 days ago, she broke her fast with dinner. about an hour later was sitting and feeding her grandchild at the table. had syncopal event. family called EMS and was brought to this ED. had CXR, CT head, and blood work and unremarkable per family. patient reports she was feeling better, was able to ambulate, and wanted to go home. continues to have dizziness (worse with exertion, feels like she is spinning, not the room is spinning), on and off chest pressure. +shortness of breath with exertion. palpitations this am. continues to feel a "zapping" sensation in her chest on and off. reports generalized weakness since yesterday  reports having angiography 2 years ago. states "there were small blockages but did not need a stent"  PCP Leyla White

## 2021-04-24 NOTE — CONSULT NOTE ADULT - ASSESSMENT
Assessment/Plan:      Nelda Hodges DNP, NP-C  Cardiology  Spectra #[phone]         Assessment/Plan:   61 y/o F with non-obstructive CAD, HTN, HLD, T2DM presented initially to the ED 2 days ago after a syncopal episode at home.  iAt that time, she was fasting and was on her 8th day.  Had dinner and was feeding her grandson, found to have fell on the floor hitting her head and right side.  Denies Hx of syncope or seizure disorder.  Denies any prodrome.  While she was in the ED, she reported that she has been having persistent chest heaviness with pain on her left upper back that started 2 days prior to the fall.  All w/u were negative and was allowed to go home with her insistence.  Today, she presented to the ED c/o weakness, SOB, and chest heaviness again.    Syncope s/p Fall  - Has no know syncope or seizure disorder  - No evidence of hypotension or bradycardia  - No acute ischemic changes on EKG  - She has a known cardiac murmur and follows with Dr. Cindy Tapia, though, no TTE recently.  Per patient, her last TTE was about 15 years ago  - She has an audible murmur on exam of at least mild-moderate in significance  - Follow up TTE  - Please, obtain orthostatic VS daily  - Monitor on tele to assess for any arrhythmias  - CT head negative.  Recommend Neuro eval    CAD/Unstable Angina  - Admits to have had cardiac cath 2 years ago with no intervention  - Has been having chest heaviness radiating to her left upper back, SOB/DAVILA  - Her CE has been negative since she was her 2 days ago.  Would cycle x 1 more  - Continue ASA and statin  - Will likely need cardiac cath next week if agreeable    HTN  - Stable and controlled.  Not on home anti-HTN med  - Will not start now  - If needed, will start low dose ARB    T2DM  - Per Primary    DVT ppx  - Per Primary    Nelda Hodges DNP, NP-C  Cardiology  Spectra #[phone]

## 2021-04-24 NOTE — CONSULT NOTE ADULT - SUBJECTIVE AND OBJECTIVE BOX
[facility] - Benito Underwood, Astrid Washington, Carol Ann, Denny, Patito Gordon  Office Number: [phone]    Initial Consult Note    CHIEF COMPLAINT: Patient is a 60y old  Female who presents with a chief complaint of chest pain (24 Apr 2021 12:57)      HPI:  61yo female with patmedical history of hypertension, diabetes mellitus, hypothyroidism present with complain of chest pressure.    Patient states she was seen in [facility] ED on 4/22 duet to syncopal episode.    Also states 2 days prior to the syncopal episode, she had chest heaviness radiating to scapular region, but did not seek medical are at that time.     In addtion, she had been experiencing mild dyspnea on exertion for the past 3-4 days but no SOB at rest.   Today, she experienced chest pressure at 7am while lying down at rest. The pressure was located in midsternum, non radiating. She h  She has generalized weakness over the past few days. This morning she experienced palpitations after taking few steps around the home.     Pt reports angiography 2 yrs ago at [facility] but required no stent. She has been on aspirin since then.  (24 Apr 2021 12:57)    PAST MEDICAL & SURGICAL HISTORY:  Diabetes  non insulin dependent    High cholesterol    Seasonal allergies    H/O lumpectomy  2007    Post menopausal problems  bleeding post menopause  endometrial thickening, biopsy was normal  s/p D&amp;C      SOCIAL HISTORY:  No tobacco, ethanol, or drug abuse.  FAMILY HISTORY:  FH: lung cancer  father      No family history of acute MI or sudden cardiac death.  MEDICATIONS  (STANDING):    MEDICATIONS  (PRN):    Allergies    No Known Allergies    Intolerances      REVIEW OF SYSTEMS:  CONSTITUTIONAL: No weakness, fevers or chills  EYES/ENT: No visual changes;  No vertigo or throat pain   NECK: No pain or stiffness  RESPIRATORY: No cough, wheezing, hemoptysis; No shortness of breath  CARDIOVASCULAR: No chest pain or palpitations  GASTROINTESTINAL: No abdominal pain. No nausea, vomiting, or hematemesis; No diarrhea or constipation. No melena or hematochezia.  GENITOURINARY: No dysuria, frequency or hematuria  NEUROLOGICAL: No numbness or weakness  SKIN: No itching or rash  All other review of systems is negative unless indicated above  VITAL SIGNS:   Vital Signs Last 24 Hrs  T(C): 36.3 (24 Apr 2021 10:46), Max: 36.3 (24 Apr 2021 10:46)  T(F): 97.4 (24 Apr 2021 10:46), Max: 97.4 (24 Apr 2021 10:46)  HR: 84 (24 Apr 2021 10:46) (84 - 84)  BP: 123/79 (24 Apr 2021 10:46) (123/79 - 123/79)  BP(mean): --  RR: 15 (24 Apr 2021 10:46) (15 - 15)  SpO2: 98% (24 Apr 2021 10:46) (98% - 98%)  I&O's Summary    On Exam:  Constitutional: NAD, alert and oriented x 3  Lungs:  Non-labored, breath sounds are clear bilaterally, No wheezing, rales or rhonchi  Cardiovascular: RRR.  S1 and S2 positive.  No murmurs, rubs, gallops or clicks  Gastrointestinal: Bowel Sounds present, soft, nontender.   Lymph: No peripheral edema. No cervical lymphadenopathy.  Neurological: Alert, no focal deficits  Skin: No rashes or ulcers   Psych:  Mood & affect appropriate.    LABS: All Labs Reviewed:                        12.1   8.20  )-----------( 255      ( 24 Apr 2021 11:35 )             39.3                         12.7   9.50  )-----------( 258      ( 22 Apr 2021 22:21 )             41.3     24 Apr 2021 11:35    141    |  106    |  16     ----------------------------<  180    4.1     |  26     |  0.89   22 Apr 2021 22:21    140    |  105    |  19     ----------------------------<  140    4.3     |  28     |  1.00     Ca    9.0        24 Apr 2021 11:35  Ca    9.4        22 Apr 2021 22:21  Mg     1.9       24 Apr 2021 11:35    TPro  7.6    /  Alb  3.5    /  TBili  0.2    /  DBili  x      /  AST  14     /  ALT  21     /  AlkPhos  103    24 Apr 2021 11:35  TPro  7.9    /  Alb  3.7    /  TBili  0.3    /  DBili  x      /  AST  12     /  ALT  20     /  AlkPhos  107    22 Apr 2021 22:21    PT/INR - ( 22 Apr 2021 22:21 )   PT: 13.1 sec;   INR: 1.13 ratio    PTT - ( 22 Apr 2021 22:21 )  PTT:30.5 sec  CARDIAC MARKERS ( 24 Apr 2021 11:35 )  <.015 ng/mL / x     / x     / x     / x      CARDIAC MARKERS ( 22 Apr 2021 22:21 )  <.015 ng/mL / x     / x     / x     / x      Blood Culture:   04-24 @ 11:35  Pro Bnp 36  RADIOLOGY:    EKG:     [facility] - Benito Underwood, Felix, Astrid, Carol Ann, Denny, Patito Gordon  Office Number: [phone]    Initial Consult Note:  This is a 59 y/o F with non-obstructive CAD, HTN, HLD, T2DM presented initially to the ED 2 days ago after a syncopal episode at home.  iAt that time, she was fasting and was on her 8th day.  Had dinner and was feeding her grandson, found to have fell on the floor hitting her head and right side.  Denies Hx of syncope or seizure disorder.  Denies any prodrome.  While she was in the ED, she reported that she has been having persistent chest heaviness with pain on her left upper back that started 2 days prior to the fall.  All w/u were negative and was allowed to go home with her insistence.  Today, she presented to the ED c/o weakness, SOB, and chest heaviness again.    CHIEF COMPLAINT: Patient is a 60y old  Female who presents with a chief complaint of chest pain (24 Apr 2021 12:57)      HPI:  59yo female with patmedical history of hypertension, diabetes mellitus, hypothyroidism present with complain of chest pressure.    Patient states she was seen in [facility] ED on 4/22 duet to syncopal episode.    Also states 2 days prior to the syncopal episode, she had chest heaviness radiating to scapular region, but did not seek medical are at that time.     In addition, she had been experiencing mild dyspnea on exertion for the past 3-4 days but no SOB at rest.   Today, she experienced chest pressure at 7am while lying down at rest. The pressure was located in midsternum, non radiating. She h  She has generalized weakness over the past few days. This morning she experienced palpitations after taking few steps around the home.     Pt reports angiography 2 yrs ago at [facility] but required no stent. She has been on aspirin since then.  (24 Apr 2021 12:57)    PAST MEDICAL & SURGICAL HISTORY:  Diabetes  non insulin dependent    High cholesterol    Seasonal allergies    H/O lumpectomy  2007    Post menopausal problems  bleeding post menopause  endometrial thickening, biopsy was normal  s/p D&amp;C    SOCIAL HISTORY:  No tobacco, ethanol, or drug abuse.  FAMILY HISTORY:  FH: lung cancer  father    No family history of acute MI or sudden cardiac death.  MEDICATIONS  (STANDING):    MEDICATIONS  (PRN):    Allergies    No Known Allergies    Intolerances    REVIEW OF SYSTEMS:  CONSTITUTIONAL: + weakness, no fevers or chills  EYES/ENT: No visual changes;  No vertigo or throat pain   NECK: No pain or stiffness  RESPIRATORY: No cough, wheezing, hemoptysis; + shortness of breath  CARDIOVASCULAR: + chest heaviness and palpitations  GASTROINTESTINAL: No abdominal pain. No nausea, vomiting, or hematemesis; No diarrhea or constipation. No melena or hematochezia.  GENITOURINARY: No dysuria, frequency or hematuria  NEUROLOGICAL: No numbness or weakness  SKIN: No itching or rash  All other review of systems is negative unless indicated above  VITAL SIGNS:   Vital Signs Last 24 Hrs  T(C): 36.3 (24 Apr 2021 10:46), Max: 36.3 (24 Apr 2021 10:46)  T(F): 97.4 (24 Apr 2021 10:46), Max: 97.4 (24 Apr 2021 10:46)  HR: 84 (24 Apr 2021 10:46) (84 - 84)  BP: 123/79 (24 Apr 2021 10:46) (123/79 - 123/79)  BP(mean): --  RR: 15 (24 Apr 2021 10:46) (15 - 15)  SpO2: 98% (24 Apr 2021 10:46) (98% - 98%)  I&O's Summary    On Exam:  Constitutional: NAD, alert and oriented x 3, obese  Lungs:  Non-labored, breath sounds are clear bilaterally, No wheezing, rales or rhonchi  Cardiovascular: RRR.  S1 and S2 positive.  + murmurs, no rubs, gallops or clicks  Gastrointestinal: Bowel Sounds present, soft, nontender.   Lymph: No peripheral edema. No cervical lymphadenopathy.  Neurological: Alert, no focal deficits  Skin: No rashes or ulcers   Psych:  Mood & affect appropriate.    LABS: All Labs Reviewed:                        12.1   8.20  )-----------( 255      ( 24 Apr 2021 11:35 )             39.3                         12.7   9.50  )-----------( 258      ( 22 Apr 2021 22:21 )             41.3     24 Apr 2021 11:35    141    |  106    |  16     ----------------------------<  180    4.1     |  26     |  0.89   22 Apr 2021 22:21    140    |  105    |  19     ----------------------------<  140    4.3     |  28     |  1.00     Ca    9.0        24 Apr 2021 11:35  Ca    9.4        22 Apr 2021 22:21  Mg     1.9       24 Apr 2021 11:35    TPro  7.6    /  Alb  3.5    /  TBili  0.2    /  DBili  x      /  AST  14     /  ALT  21     /  AlkPhos  103    24 Apr 2021 11:35  TPro  7.9    /  Alb  3.7    /  TBili  0.3    /  DBili  x      /  AST  12     /  ALT  20     /  AlkPhos  107    22 Apr 2021 22:21    PT/INR - ( 22 Apr 2021 22:21 )   PT: 13.1 sec;   INR: 1.13 ratio    PTT - ( 22 Apr 2021 22:21 )  PTT:30.5 sec  CARDIAC MARKERS ( 24 Apr 2021 11:35 )  <.015 ng/mL / x     / x     / x     / x      CARDIAC MARKERS ( 22 Apr 2021 22:21 )  <.015 ng/mL / x     / x     / x     / x      Blood Culture:   04-24 @ 11:35  Pro Bnp 36    RADIOLOGY:    EXAM:  XR CHEST AP OR PA 1V                          PROCEDURE DATE:  04/22/2021      INTERPRETATION:  AP semierect chest on April 22, 2021 at 11:23 PM. Patient has syncopal episode.    COMPARISON: None available.    Heart size is within normal limits.    The lung fields and pleural surfaces are unremarkable.    IMPRESSION: Negative chest.    REGAN GALLEGOS M.D., ATTENDING RADIOLOGIST  This document has been electronically signed. Apr 23 2021  8:41AM    EKG: NSR with Q waves in V1-3 with poor R wave progression.  No acute changes

## 2021-04-24 NOTE — ED PROVIDER NOTE - NEUROLOGICAL, MLM
Alert and oriented, no focal deficits, no motor or sensory deficits. symmetric eyebrow raise and smile. elevates tongue and shoulders without difficulty. normal finger to nose. good   strength bilaterally

## 2021-04-24 NOTE — H&P ADULT - ASSESSMENT
61yo female with past medical history of hypertension, diabetes mellitus, hypothyroidism present with complain of chest pressure, generalized weakness, dyspnea on exertion, syncopal episode 2 days ago prior to admission. Admitted for work up and r/o ACS.   COVID PCR pending  CT head 4/22; no acute intracranial hem  CXR 4/22: no acute infiltrates  Hx of cardia cath 12/27/2018: RCA Angiography showed minor luminal irregularities with no flow limiting lesions. lm, LAD and circumflex were normal.

## 2021-04-24 NOTE — H&P ADULT - NSICDXPASTSURGICALHX_GEN_ALL_CORE_FT
PAST SURGICAL HISTORY:  H/O lumpectomy 2007    Post menopausal problems bleeding post menopause  endometrial thickening, biopsy was normal  s/p D&C

## 2021-04-24 NOTE — H&P ADULT - NSHPPHYSICALEXAM_GEN_ALL_CORE
Vital Signs Last 24 Hrs  T(C): 36.3 (24 Apr 2021 10:46), Max: 36.3 (24 Apr 2021 10:46)  T(F): 97.4 (24 Apr 2021 10:46), Max: 97.4 (24 Apr 2021 10:46)  HR: 84 (24 Apr 2021 10:46) (84 - 84)  BP: 123/79 (24 Apr 2021 10:46) (123/79 - 123/79)  BP(mean): --  RR: 15 (24 Apr 2021 10:46) (15 - 15)  SpO2: 98% (24 Apr 2021 10:46) (98% - 98%) Vital Signs Last 24 Hrs  T(C): 36.3 (24 Apr 2021 10:46), Max: 36.3 (24 Apr 2021 10:46)  T(F): 97.4 (24 Apr 2021 10:46), Max: 97.4 (24 Apr 2021 10:46)  HR: 84 (24 Apr 2021 10:46) (84 - 84)  BP: 123/79 (24 Apr 2021 10:46) (123/79 - 123/79)  BP(mean): --  RR: 15 (24 Apr 2021 10:46) (15 - 15)  SpO2: 98% (24 Apr 2021 10:46) (98% - 98%)    GENERAL: patient appears well, no acute distress, appropriate  EYES: sclera clear, no exudates, PERRLA  ENMT: oropharynx clear without erythema, no exudates, moist mucous membranes  NECK: supple, soft, no JVD  LUNGS:  clear to auscultation,  no rales, wheezing or rhonchi appreciated  HEART: S1/S2, regular rate and rhythm, no murmurs noted, no lower extremity edema, pulses  GASTROINTESTINAL: abdomen is soft, nontender, nondistended, normoactive bowel sounds, no palpable masses  INTEGUMENT: good skin turgor, warm, dry and intact  MUSCULOSKELETAL: no clubbing or cyanosis, no obvious deformity  NEUROLOGIC: awake, alert, oriented x3, strength no obvious sensory deficits, UE and LE strength 5/5 B/L, CN gorssly intact  PSYCHIATRIC: mood is good, affect is congruent, linear and logical thought process  HEME/LYMPH:  no obvious ecchymosis or petechiae Vital Signs Last 24 Hrs  T(C): 36.3 (24 Apr 2021 10:46), Max: 36.3 (24 Apr 2021 10:46)  T(F): 97.4 (24 Apr 2021 10:46), Max: 97.4 (24 Apr 2021 10:46)  HR: 84 (24 Apr 2021 10:46) (84 - 84)  BP: 123/79 (24 Apr 2021 10:46) (123/79 - 123/79)  BP(mean): --  RR: 15 (24 Apr 2021 10:46) (15 - 15)  SpO2: 98% (24 Apr 2021 10:46) (98% - 98%)    GENERAL: patient appears well, no acute distress, appropriate  EYES: sclera clear, no exudates, PERRLA  ENMT: oropharynx clear without erythema, no exudates, moist mucous membranes  NECK: supple, soft, no JVD  LUNGS:  clear to auscultation,  no rales, wheezing or rhonchi appreciated  HEART: S1/S2, regular rate and rhythm, +systolic murmur noted, no lower extremity edema, pulses  GASTROINTESTINAL: abdomen is soft, nontender, nondistended, normoactive bowel sounds, no palpable masses  INTEGUMENT: good skin turgor, warm, dry and intact  MUSCULOSKELETAL: no clubbing or cyanosis, no obvious deformity  NEUROLOGIC: awake, alert, oriented x3, strength no obvious sensory deficits, UE and LE strength 5/5 B/L, CN gorssly intact  PSYCHIATRIC: mood is good, affect is congruent, linear and logical thought process  HEME/LYMPH:  no obvious ecchymosis or petechiae

## 2021-04-24 NOTE — H&P ADULT - HISTORY OF PRESENT ILLNESS
61yo female with patmedical history of hypertension, diabetes mellitus, hypothyroidism present with complain of chest pressure.    Patient states she was seen in [facility] ED on 4/22 duet to syncopal episode.    Also states 2 days prior to the syncopal episode, she had chest heaviness radiating to scapular region, but did not seek medical are at that time.     In addtion, she had been experiencing mild dyspnea on exertion for the past 3-4 days but no SOB at rest.   Today, she experienced chest pressure at 7am while lying down at rest. The pressure was located in midsternum, non radiating. She h  She has generalized weakness over the past few days. This morning she experienced palpitations after taking few steps around the home.     Pt reports angiography 2 yrs ago at [facility] but required no stent. She has been on aspirin since then.  59yo female with past medical history of hypertension, diabetes mellitus, hypothyroidism present with complain of chest pressure.    Patient states she was seen in [facility] ED on 4/22 duet to syncopal episode.    Also states 2 days prior to the syncopal episode (eyes rolled back, lasted 5 seconds,  conscious but appeared to understand her son very weak, collapsed , BG was checked by paramedics 259, no urinary incontinence), she had chest heaviness radiating to scapular region, but did not seek medical are at that time.    Of note, son said medical staff offered admission for observation but patient wanted to go home.     [facility] had been experiencing mild dyspnea on exertion for the past 3-4 days but no SOB at rest.   Today, she experienced chest pressure at 7am while lying down at rest. The pressure was located in midsternum, non radiating.     She c/o dyspnea, lightheadedness and weakness.   She has generalized weakness over the past few days. This morning she experienced palpitations after taking few steps around the home.     Pt reports angiography 2 yrs ago at [facility] but required no stent. She has been on aspirin since then.     Denies recent travel, prolonged immobility. She is independent with ADLs and iADLs.  59yo female with past medical history of hypertension, diabetes mellitus, hypothyroidism present with complain of chest pressure, generalized weakness. Patient states she was seen in [facility] ED on 4/22 due to a syncopal episode. She reports sitting with her grandchild and family, then all of a sudden felt lightheaded, and weak and slipped off to the ground. Per son (who was at bedside), pt had LOC for few seconds and when she opened her eyes she asked what happened, this was followed by by about 1 minue of not responding verbally but she did nod her head to show she could hear her name being called. They  her and took her to the chair, and she was so weak that she couldnt sit up. EMS was called , BG was checked, per patient it was about 250 at the time. She was evaluated in ED that day. Of note, son said medical staff offered admission for observation but patient wanted to go home. Pt denied any involuntary movement or urinary incontinence at that time.   In addition, she had been experiencing mild dyspnea on exertion for the past 3-4 days prior to today (no SOB at rest). She also had an episode of chest pressure/discomfort 2 days prior to the syncopal episode that resolved on its own.   Today, she experienced chest pressure at 7am while lying down at rest. The pressure was located in midsternum, non radiating.       She c/o dyspnea, lightheadedness and weakness.   She has generalized weakness over the past few days. This morning she experienced palpitations after taking few steps around the home.     Pt reports angiography 2 yrs ago at [facility] but required no stent. She has been on baby aspirin since then.   Denies recent travel, prolonged immobility. She is independent with ADLs and iADLs.     ED course: VSS, trop negative. 59yo female with past medical history of hypertension, diabetes mellitus, hypothyroidism present with complain of chest pressure, generalized weakness. Patient states she was seen in [facility] ED on 4/22 due to a syncopal episode. She reports sitting with her grandchild and family, then all of a sudden felt lightheaded, and weak and slipped off to the ground. Per son (who was at bedside), pt had LOC for few seconds and when she opened her eyes she asked what happened, this was followed by by about 1 minue of not responding verbally but she did nod her head to show she could hear her name being called. They  her and took her to the chair, and she was so weak that she couldnt sit up. EMS was called , BG was checked, per patient it was about 250 at the time. She was evaluated in ED that day. Of note, son said medical staff offered admission for observation but patient wanted to go home. Pt denied any involuntary movement or urinary incontinence at that time.   In addition, she had been experiencing mild dyspnea on exertion for the past 3-4 days prior to today (no SOB at rest). She also had an episode of chest pressure/discomfort 2 days prior to the syncopal episode that resolved on its own.   Today, she experienced chest pressure at around 7am while lying down at rest. The pressure was located in midsternum, non radiating. Later on, she experienced palpitations after taking few steps around the home (also resolved on its own). She also c/o dyspnea with exertion, lightheadedness and weakness. She called her brother who is a doctor in [place] and he advised her to fo to hospital. Of note, pt is currently undergoing daily fasting due to Protestant belief; she has no intake for about 12hrs in day time, breaks the fast at sundown. Pt reports angiography 2 yrs ago at [facility] but required no stent. She has been on baby aspirin since then.   Denies recent travel, prolonged immobility. She is independent with ADLs and iADLs.     ED course: VSS, trop negative.

## 2021-04-24 NOTE — ED PROVIDER NOTE - ATTENDING CONTRIBUTION TO CARE
Pt is a 59 yo female who presents to the ED with a cc of chest pressure and worsening exertional SOB. PMHx of HLD, HTN, DM. Pt reports that she was initially seen in the ED on 4/22. She had been fasting for the holidays and reports that she broke her fast and had eaten and taken her medication. She then was sitting at the table and was feeding her grandson when she developed some black spots in her vision and then suffered a syncopal episode. She was seen in the ED underwent work up and was discharged home. She does report that 2 days prior she had noted that she developed heaviness in her chest radiating to her back with associated SOB. She does note that the SOB and chest pressure worsen on exertion. She reports that since discharge she has continued to feel weak with chest pressure and SOB worse on exertion. She also notes that even when she takes several steps she develops palpitations. Denies fever, chills, N/V, abd pain, ext numbness or weakness. She has also noted a cough. Denies h/o COVID 19 received her COVID vaccines Feb, March. Of note pt also reports that 12/2018 she had a cardiac cath with minor irregularities in the RCA. On exam pt lying in bed NAD, NCAT, PERRL, EOMI, heart RR systolic murmur, lungs CTA, abd soft NT/ND. No focal neurological deficits. pt presenting with s/s concerning for possible angina. Will review prior labs, cardiac cath, obtain EKG, chest x-ray, and monitor

## 2021-04-24 NOTE — ED PROVIDER NOTE - CARE PLAN
Principal Discharge DX:	Chest pressure  Secondary Diagnosis:	Lightheadedness  Secondary Diagnosis:	Syncope  Secondary Diagnosis:	Diabetes  Secondary Diagnosis:	High cholesterol

## 2021-04-24 NOTE — H&P ADULT - NSICDXPROBLEMPLAN3_GEN_ALL_CORE_FT
Syncopal episode 2 days ago, was seen in [facility] ED. Per HPI, LOC lasted 3-5 seconds.  Monitor on tele  Cardio c/s  Pending orthostatics, echo, carotid dopplers  IV hydrate  PT eval Syncopal episode 2 days ago, was seen in [facility] ED. Per HPI, LOC lasted 3-5 seconds.  Monitor on tele  Cardio c/s  Pending orthostatics, echo, carotid dopplers  IVF  PT eval

## 2021-04-24 NOTE — ED PROVIDER NOTE - PROGRESS NOTE DETAILS
resting comfortably. no distress. spoke with Dr. Washington (cards), will see patient in ED. spoke with Dr. Robertson, kindly accepts patient for admission

## 2021-04-24 NOTE — H&P ADULT - NSICDXPASTMEDICALHX_GEN_ALL_CORE_FT
PAST MEDICAL HISTORY:  Diabetes non insulin dependent    High cholesterol     Seasonal allergies

## 2021-04-24 NOTE — H&P ADULT - NSHPREVIEWOFSYSTEMS_GEN_ALL_CORE
CONSTITUTIONAL: denies fever, chills, fatigue, weakness  HEENT: denies blurred vision, sore throat  SKIN: denies new lesions, rash  CARDIOVASCULAR: denies chest pain, chest pressure, palpitations  RESPIRATORY: denies shortness of breath, sputum production  GASTROINTESTINAL: denies nausea, vomiting, diarrhea, abdominal pain  GENITOURINARY: denies dysuria, discharge  NEUROLOGICAL: denies numbness, headache, focal weakness  MUSCULOSKELETAL: denies new joint pain, muscle aches  HEMATOLOGIC: denies gross bleeding, bruising  LYMPHATICS: denies enlarged lymph nodes, extremity swelling  PSYCHIATRIC: denies recent changes in anxiety, depression  ENDOCRINOLOGIC: denies sweating, cold or heat intolerance CONSTITUTIONAL: denies fever, chills, fatigue. +weakness  HEENT: denies blurred vision, sore throat  SKIN: denies new lesions, rash  CARDIOVASCULAR: denies chest pain. +chest pressure, palpitations  RESPIRATORY: denies shortness of breath, sputum production  GASTROINTESTINAL: denies nausea, vomiting, diarrhea, abdominal pain  GENITOURINARY: denies dysuria, discharge  NEUROLOGICAL: denies numbness, headache, focal weakness. +lightheadedness  MUSCULOSKELETAL: denies new joint pain, muscle aches  HEMATOLOGIC: denies gross bleeding, bruising  LYMPHATICS: denies enlarged lymph nodes, extremity swelling  PSYCHIATRIC: denies recent changes in anxiety, depression  ENDOCRINOLOGIC: denies sweating, cold or heat intolerance

## 2021-04-24 NOTE — ED ADULT NURSE NOTE - OBJECTIVE STATEMENT
a/ox4 patient came to ED c/o shortness of breath and dizziness for 1 week. Patient seen here two days ago for same complaints and discharged to f. with cardio. Per patients son when he called cardio office they stated she couldn't be seen by MD saldana bc she was never seen by him. Afebrile, no n/v/d. Pending further lab and radiology reports.

## 2021-04-24 NOTE — H&P ADULT - NSICDXPROBLEMPLAN1_GEN_ALL_CORE_FT
C/o chest pressure this AM and few days ago, no longer having chest pressure on presentation to hospital  Admit to telemetry  EKG; NSR, HR 80bpm  Initial trop negative, f/u repeat cardiac enzymes Q6  Cont ASA, statin  Cardio c/s  Pending lipids C/o chest pressure this AM and few days ago, no longer having chest pressure on presentation to hospital  Admit to telemetry  EKG; NSR, HR 80bpm  Initial trop negative, f/u repeat cardiac enzymes Q6  F/u CTA, Echo, lipid panel  Cont ASA, statin  Cardio c/s

## 2021-04-24 NOTE — H&P ADULT - NSICDXPROBLEMPLAN4_GEN_ALL_CORE_FT
On levo PO, conitnue home dose  F/u TSH On levo 112mcg qd and additonal 25mcg QOD PO; continue home dose  F/u TSH

## 2021-04-24 NOTE — ED ADULT TRIAGE NOTE - CHIEF COMPLAINT QUOTE
As per son, she was here 2 days ago bc she passed out. Since then shes been really dizzy and has palpitations and trouble breathing

## 2021-04-25 LAB
24R-OH-CALCIDIOL SERPL-MCNC: 26.4 NG/ML — LOW (ref 30–80)
A1C WITH ESTIMATED AVERAGE GLUCOSE RESULT: 7.1 % — HIGH (ref 4–5.6)
ALBUMIN SERPL ELPH-MCNC: 3.4 G/DL — SIGNIFICANT CHANGE UP (ref 3.3–5)
ALP SERPL-CCNC: 99 U/L — SIGNIFICANT CHANGE UP (ref 40–120)
ALT FLD-CCNC: 21 U/L — SIGNIFICANT CHANGE UP (ref 12–78)
ANION GAP SERPL CALC-SCNC: 7 MMOL/L — SIGNIFICANT CHANGE UP (ref 5–17)
AST SERPL-CCNC: 13 U/L — LOW (ref 15–37)
BASOPHILS # BLD AUTO: 0.06 K/UL — SIGNIFICANT CHANGE UP (ref 0–0.2)
BASOPHILS NFR BLD AUTO: 0.8 % — SIGNIFICANT CHANGE UP (ref 0–2)
BILIRUB SERPL-MCNC: 0.3 MG/DL — SIGNIFICANT CHANGE UP (ref 0.2–1.2)
BUN SERPL-MCNC: 14 MG/DL — SIGNIFICANT CHANGE UP (ref 7–23)
CALCIUM SERPL-MCNC: 9.2 MG/DL — SIGNIFICANT CHANGE UP (ref 8.5–10.1)
CHLORIDE SERPL-SCNC: 106 MMOL/L — SIGNIFICANT CHANGE UP (ref 96–108)
CHOLEST SERPL-MCNC: 145 MG/DL — SIGNIFICANT CHANGE UP
CO2 SERPL-SCNC: 28 MMOL/L — SIGNIFICANT CHANGE UP (ref 22–31)
COVID-19 SPIKE DOMAIN AB INTERP: POSITIVE
COVID-19 SPIKE DOMAIN ANTIBODY RESULT: >250 U/ML — HIGH
CREAT SERPL-MCNC: 0.69 MG/DL — SIGNIFICANT CHANGE UP (ref 0.5–1.3)
EOSINOPHIL # BLD AUTO: 0.22 K/UL — SIGNIFICANT CHANGE UP (ref 0–0.5)
EOSINOPHIL NFR BLD AUTO: 2.9 % — SIGNIFICANT CHANGE UP (ref 0–6)
ESTIMATED AVERAGE GLUCOSE: 157 MG/DL — HIGH (ref 68–114)
FERRITIN SERPL-MCNC: 30 NG/ML — SIGNIFICANT CHANGE UP (ref 15–150)
GLUCOSE SERPL-MCNC: 101 MG/DL — HIGH (ref 70–99)
HCT VFR BLD CALC: 38.2 % — SIGNIFICANT CHANGE UP (ref 34.5–45)
HCV AB S/CO SERPL IA: 0.11 S/CO — SIGNIFICANT CHANGE UP (ref 0–0.99)
HCV AB SERPL-IMP: SIGNIFICANT CHANGE UP
HDLC SERPL-MCNC: 44 MG/DL — LOW
HGB BLD-MCNC: 11.7 G/DL — SIGNIFICANT CHANGE UP (ref 11.5–15.5)
HIV 1+2 AB+HIV1 P24 AG SERPL QL IA: SIGNIFICANT CHANGE UP
IMM GRANULOCYTES NFR BLD AUTO: 0.3 % — SIGNIFICANT CHANGE UP (ref 0–1.5)
IRON SATN MFR SERPL: 10 % — LOW (ref 14–50)
IRON SATN MFR SERPL: 37 UG/DL — SIGNIFICANT CHANGE UP (ref 30–160)
LIPID PNL WITH DIRECT LDL SERPL: 82 MG/DL — SIGNIFICANT CHANGE UP
LYMPHOCYTES # BLD AUTO: 2.57 K/UL — SIGNIFICANT CHANGE UP (ref 1–3.3)
LYMPHOCYTES # BLD AUTO: 33.8 % — SIGNIFICANT CHANGE UP (ref 13–44)
MCHC RBC-ENTMCNC: 23.5 PG — LOW (ref 27–34)
MCHC RBC-ENTMCNC: 30.6 GM/DL — LOW (ref 32–36)
MCV RBC AUTO: 76.7 FL — LOW (ref 80–100)
MONOCYTES # BLD AUTO: 0.48 K/UL — SIGNIFICANT CHANGE UP (ref 0–0.9)
MONOCYTES NFR BLD AUTO: 6.3 % — SIGNIFICANT CHANGE UP (ref 2–14)
NEUTROPHILS # BLD AUTO: 4.26 K/UL — SIGNIFICANT CHANGE UP (ref 1.8–7.4)
NEUTROPHILS NFR BLD AUTO: 55.9 % — SIGNIFICANT CHANGE UP (ref 43–77)
NON HDL CHOLESTEROL: 101 MG/DL — SIGNIFICANT CHANGE UP
NRBC # BLD: 0 /100 WBCS — SIGNIFICANT CHANGE UP (ref 0–0)
PLATELET # BLD AUTO: 244 K/UL — SIGNIFICANT CHANGE UP (ref 150–400)
POTASSIUM SERPL-MCNC: 4.1 MMOL/L — SIGNIFICANT CHANGE UP (ref 3.5–5.3)
POTASSIUM SERPL-SCNC: 4.1 MMOL/L — SIGNIFICANT CHANGE UP (ref 3.5–5.3)
PROT SERPL-MCNC: 7.3 G/DL — SIGNIFICANT CHANGE UP (ref 6–8.3)
RBC # BLD: 4.98 M/UL — SIGNIFICANT CHANGE UP (ref 3.8–5.2)
RBC # FLD: 18.9 % — HIGH (ref 10.3–14.5)
SARS-COV-2 IGG+IGM SERPL QL IA: >250 U/ML — HIGH
SARS-COV-2 IGG+IGM SERPL QL IA: POSITIVE
SODIUM SERPL-SCNC: 141 MMOL/L — SIGNIFICANT CHANGE UP (ref 135–145)
TIBC SERPL-MCNC: 366 UG/DL — SIGNIFICANT CHANGE UP (ref 220–430)
TRIGL SERPL-MCNC: 98 MG/DL — SIGNIFICANT CHANGE UP
UIBC SERPL-MCNC: 328 UG/DL — SIGNIFICANT CHANGE UP (ref 110–370)
WBC # BLD: 7.61 K/UL — SIGNIFICANT CHANGE UP (ref 3.8–10.5)
WBC # FLD AUTO: 7.61 K/UL — SIGNIFICANT CHANGE UP (ref 3.8–10.5)

## 2021-04-25 PROCEDURE — 99232 SBSQ HOSP IP/OBS MODERATE 35: CPT | Mod: GC

## 2021-04-25 PROCEDURE — 99232 SBSQ HOSP IP/OBS MODERATE 35: CPT

## 2021-04-25 PROCEDURE — 93880 EXTRACRANIAL BILAT STUDY: CPT | Mod: 26

## 2021-04-25 RX ADMIN — Medication 81 MILLIGRAM(S): at 11:48

## 2021-04-25 RX ADMIN — SIMVASTATIN 10 MILLIGRAM(S): 20 TABLET, FILM COATED ORAL at 21:45

## 2021-04-25 RX ADMIN — ATENOLOL 25 MILLIGRAM(S): 25 TABLET ORAL at 05:27

## 2021-04-25 RX ADMIN — Medication 112 MICROGRAM(S): at 05:27

## 2021-04-25 RX ADMIN — Medication 1: at 17:16

## 2021-04-25 RX ADMIN — ENOXAPARIN SODIUM 40 MILLIGRAM(S): 100 INJECTION SUBCUTANEOUS at 11:49

## 2021-04-25 RX ADMIN — Medication 1: at 12:15

## 2021-04-25 NOTE — PROGRESS NOTE ADULT - ASSESSMENT
61 y/o F with non-obstructive CAD, HTN, HLD, T2DM presented initially to the ED 2 days ago after a syncopal episode at home.  iAt that time, she was fasting and was on her 8th day.  Had dinner and was feeding her grandson, found to have fell on the floor hitting her head and right side.  Denies Hx of syncope or seizure disorder.  Denies any prodrome.  While she was in the ED, she reported that she has been having persistent chest heaviness with pain on her left upper back that started 2 days prior to the fall.  All w/u were negative and was allowed to go home with her insistence.  Today, she presented to the ED c/o weakness, SOB, and chest heaviness again.    Syncope s/p Fall  - Has no know syncope or seizure disorder  - No evidence of hypotension or bradycardia  - No acute ischemic changes on EKG  - She has a known cardiac murmur and follows with Dr. Cindy Tapia, though, no TTE recently.   - She has an audible murmur on exam of at least mild-moderate in significance  - TTE here showed normal RVF.LVF, EF 65% with mod AI.  No other valvular pathology  - Please, obtain orthostatic VS daily  - Monitor on tele to assess for any arrhythmias  - CT head negative.  CUS negative.  Recommend Neuro eval    CAD/Unstable Angina  - Admits to have had cardiac cath 2 years ago with no intervention  - Has been having chest heaviness radiating to her left upper back, SOB/DAVILA.  CTPA negative of PE  - All CE's negative  - Continue ASA and statin    HTN  - Remains stable and controlled.  Not on home anti-HTN med  - home Atenolol restarted    T2DM  - Per Primary    DVT ppx  - Per Primary    Nelda Hodges DNP, NP-C  Cardiology  Spectra #[phone]   61 y/o F with non-obstructive CAD, HTN, HLD, T2DM presented initially to the ED 2 days ago after a syncopal episode at home.  iAt that time, she was fasting and was on her 8th day.  Had dinner and was feeding her grandson, found to have fell on the floor hitting her head and right side.  Denies Hx of syncope or seizure disorder.  Denies any prodrome.  While she was in the ED, she reported that she has been having persistent chest heaviness with pain on her left upper back that started 2 days prior to the fall.  All w/u were negative and was allowed to go home with her insistence.  Today, she presented to the ED c/o weakness, SOB, and chest heaviness again.    Syncope s/p Fall  - Has no know syncope or seizure disorder  - No evidence of hypotension or bradycardia  - No acute ischemic changes on EKG  - She has a known cardiac murmur and follows with Dr. Cindy Tapia, though, no TTE recently.   - She has an audible murmur on exam of at least mild-moderate in significance  - TTE here showed normal RVF.LVF, EF 65% with mod AI.  No other valvular pathology  - Please, obtain orthostatic VS daily  - Monitor on tele to assess for any arrhythmias  - CT head negative.  CUS negative.  Called Neuro consult, spoke with Dr. Ventura    CAD/Unstable Angina  - Admits to have had cardiac cath 2 years ago with no intervention  - Has been having chest heaviness radiating to her left upper back, SOB/DAVILA.  CTPA negative of PE  - All CE's negative  - Continue ASA and statin    HTN  - Remains stable and controlled.  Not on home anti-HTN med  - home Atenolol restarted    T2DM  - Per Primary    DVT ppx  - Per Primary    Nelda Hodges DNP, NP-C  Cardiology  Spectra #[phone]

## 2021-04-25 NOTE — PROGRESS NOTE ADULT - NSICDXPROBLEMPLAN3_GEN_ALL_CORE_FT
Syncopal episode 2 days ago, was seen in [facility] ED. Per HPI, LOC lasted 3-5 seconds.  Monitor on tele  Cardio c/s  Pending orthostatics, echo, carotid dopplers  IVF  PT eval

## 2021-04-25 NOTE — PROGRESS NOTE ADULT - NSICDXPROBLEMPLAN1_GEN_ALL_CORE_FT
C/o chest pressure this AM and few days ago, no longer having chest pressure on presentation to hospital  Admit to telemetry  EKG; NSR, HR 80bpm  Initial trop negative, f/u repeat cardiac enzymes Q6  F/u CTA, Echo, lipid panel  Cont ASA, statin  Cardio c/s

## 2021-04-25 NOTE — PROGRESS NOTE ADULT - NSICDXPROBLEMPLAN2_GEN_ALL_CORE_FT
Etiology likley related to fasting for prolonged period (pt undergoing Ramadan fast x past 10days)  F/u labs including A1c, TSH, cbc. cmp, iron panel, vit D  See work  up above  IV fluids  PT eval

## 2021-04-25 NOTE — PHYSICAL THERAPY INITIAL EVALUATION ADULT - ADDITIONAL COMMENTS
Patient reports she lives in a 1 story home with her son with 1 JUANCARLOS. patient reports 4 steps to bedroom. patient reports her son works from home and can assist upon discharge. Patient reports she occasionally amb with [facility] due to L knee pain form previous torn meniscus. Patient was independent prior to admission

## 2021-04-26 ENCOUNTER — TRANSCRIPTION ENCOUNTER (OUTPATIENT)
Age: 61
End: 2021-04-26

## 2021-04-26 VITALS
RESPIRATION RATE: 18 BRPM | TEMPERATURE: 98 F | SYSTOLIC BLOOD PRESSURE: 109 MMHG | DIASTOLIC BLOOD PRESSURE: 76 MMHG | HEART RATE: 74 BPM | OXYGEN SATURATION: 98 %

## 2021-04-26 PROCEDURE — 97530 THERAPEUTIC ACTIVITIES: CPT

## 2021-04-26 PROCEDURE — 83550 IRON BINDING TEST: CPT

## 2021-04-26 PROCEDURE — 99232 SBSQ HOSP IP/OBS MODERATE 35: CPT | Mod: GC

## 2021-04-26 PROCEDURE — 99232 SBSQ HOSP IP/OBS MODERATE 35: CPT

## 2021-04-26 PROCEDURE — 83540 ASSAY OF IRON: CPT

## 2021-04-26 PROCEDURE — 82553 CREATINE MB FRACTION: CPT

## 2021-04-26 PROCEDURE — 85730 THROMBOPLASTIN TIME PARTIAL: CPT

## 2021-04-26 PROCEDURE — 97116 GAIT TRAINING THERAPY: CPT

## 2021-04-26 PROCEDURE — 86803 HEPATITIS C AB TEST: CPT

## 2021-04-26 PROCEDURE — 85610 PROTHROMBIN TIME: CPT

## 2021-04-26 PROCEDURE — U0005: CPT

## 2021-04-26 PROCEDURE — 99285 EMERGENCY DEPT VISIT HI MDM: CPT

## 2021-04-26 PROCEDURE — 82728 ASSAY OF FERRITIN: CPT

## 2021-04-26 PROCEDURE — 82306 VITAMIN D 25 HYDROXY: CPT

## 2021-04-26 PROCEDURE — 71275 CT ANGIOGRAPHY CHEST: CPT

## 2021-04-26 PROCEDURE — U0003: CPT

## 2021-04-26 PROCEDURE — 99284 EMERGENCY DEPT VISIT MOD MDM: CPT | Mod: 25

## 2021-04-26 PROCEDURE — 83880 ASSAY OF NATRIURETIC PEPTIDE: CPT

## 2021-04-26 PROCEDURE — 83735 ASSAY OF MAGNESIUM: CPT

## 2021-04-26 PROCEDURE — 82550 ASSAY OF CK (CPK): CPT

## 2021-04-26 PROCEDURE — 71045 X-RAY EXAM CHEST 1 VIEW: CPT

## 2021-04-26 PROCEDURE — 70450 CT HEAD/BRAIN W/O DYE: CPT

## 2021-04-26 PROCEDURE — 82962 GLUCOSE BLOOD TEST: CPT

## 2021-04-26 PROCEDURE — 93005 ELECTROCARDIOGRAM TRACING: CPT

## 2021-04-26 PROCEDURE — 36415 COLL VENOUS BLD VENIPUNCTURE: CPT

## 2021-04-26 PROCEDURE — 85025 COMPLETE CBC W/AUTO DIFF WBC: CPT

## 2021-04-26 PROCEDURE — 80053 COMPREHEN METABOLIC PANEL: CPT

## 2021-04-26 PROCEDURE — 93306 TTE W/DOPPLER COMPLETE: CPT

## 2021-04-26 PROCEDURE — 85379 FIBRIN DEGRADATION QUANT: CPT

## 2021-04-26 PROCEDURE — 84484 ASSAY OF TROPONIN QUANT: CPT

## 2021-04-26 PROCEDURE — 86769 SARS-COV-2 COVID-19 ANTIBODY: CPT

## 2021-04-26 PROCEDURE — 80061 LIPID PANEL: CPT

## 2021-04-26 PROCEDURE — 93880 EXTRACRANIAL BILAT STUDY: CPT

## 2021-04-26 PROCEDURE — 87389 HIV-1 AG W/HIV-1&-2 AB AG IA: CPT

## 2021-04-26 PROCEDURE — 97161 PT EVAL LOW COMPLEX 20 MIN: CPT

## 2021-04-26 PROCEDURE — 83036 HEMOGLOBIN GLYCOSYLATED A1C: CPT

## 2021-04-26 RX ADMIN — Medication 25 MICROGRAM(S): at 05:17

## 2021-04-26 RX ADMIN — Medication 1: at 08:02

## 2021-04-26 RX ADMIN — Medication 112 MICROGRAM(S): at 05:17

## 2021-04-26 NOTE — PROGRESS NOTE ADULT - ASSESSMENT
61 y/o F with non-obstructive CAD, HTN, HLD, T2DM presented initially to the ED 2 days ago after a syncopal episode at home.  At that time, she was fasting and was on her 8th day.  Had dinner and was feeding her grandson, found to have fell on the floor hitting her head and right side.  Denies Hx of syncope or seizure disorder.  Denies any prodrome.  While she was in the ED, she reported that she has been having persistent chest heaviness with pain on her left upper back that started 2 days prior to the fall.  All w/u were negative and was allowed to go home with her insistence.  Today, she presented to the ED c/o weakness, SOB, and chest heaviness again.    Syncope s/p Fall  - Has no know syncope or seizure disorder  - No evidence of hypotension or bradycardia  - No acute ischemic changes on EKG  - She has a known cardiac murmur and follows with Dr. Cindy Tapia, though, no TTE recently.   - She has an audible murmur on exam of at least mild-moderate in significance  - TTE here showed normal RVF.LVF, EF 65% with mod AI.  No other valvular pathology  - Tele with SR, no arrhythmias   - CT head negative.  CUS negative.  Pending neuro consult.     CAD/Unstable Angina  - Admits to have had cardiac cath 2 years ago with no intervention  - Has been having chest heaviness radiating to her left upper back, SOB/DAVILA.  CTPA negative of PE  - All CE's negative  - Continue ASA and statin    HTN  - BP: 109/76 (04-26-21 @ 11:17) (109/76 - 122/74)  - Remains stable and controlled.  Not on home anti-HTN med  - Continue Atenolol 25     DVT ppx  - Per Primary    - Patient stable for discharge from cardiac standpoint   - Monitor and replete lytes, keep K>4, Mg>2.  - All other medical needs as per primary team.  - Other cardiovascular workup will depend on clinical course.  - Will continue to follow.    Darling Celestin, MS TATUMP, [facility]  Nurse Practitioner- Cardiology   Spectra #[phone]

## 2021-04-26 NOTE — DISCHARGE NOTE PROVIDER - NSDCCPCAREPLAN_GEN_ALL_CORE_FT
PRINCIPAL DISCHARGE DIAGNOSIS  Diagnosis: Chest pressure  Assessment and Plan of Treatment: resolved, likely muskosletal  f/u cardio as outpt      SECONDARY DISCHARGE DIAGNOSES  Diagnosis: Lightheadedness  Assessment and Plan of Treatment:     Diagnosis: Syncope  Assessment and Plan of Treatment:     Diagnosis: Diabetes  Assessment and Plan of Treatment:     Diagnosis: High cholesterol  Assessment and Plan of Treatment:     Diagnosis: Seasonal allergies  Assessment and Plan of Treatment:

## 2021-04-26 NOTE — DISCHARGE NOTE PROVIDER - HOSPITAL COURSE
59yo female with past medical history of hypertension, diabetes mellitus, hypothyroidism present with complain of chest pressure, generalized weakness, dyspnea on exertion, syncopal episode 2 days ago prior to admission. Admitted for work up and r/o ACS.   COVID PCR pending  CT head 4/22; no acute intracranial hem  CXR 4/22: no acute infiltrates  Hx of cardia cath 12/27/2018: RCA Angiography showed minor luminal irregularities with no flow limiting lesions. lm, LAD and circumflex were normal.      Chest pressure.   C/o chest pressure this AM and few days ago, no longer having chest pressure on presentation to hospital  Cont ASA, statin  Cardio c/s.      Syncope. n Syncopal episode 2 days ago, was seen in [facility] ED. Per HPI, LOC lasted 3-5 seconds.  secondary to orthostatoc     Hypothyroid.  Plan On levo 112mcg qd and additonal 25mcg QOD PO; continue home dose  F/u TSH.     Hypertension.  Plan On atenolol at home, continue with parameters.       pe  nad  chest s1, s2  lungs cta

## 2021-04-26 NOTE — DISCHARGE NOTE NURSING/CASE MANAGEMENT/SOCIAL WORK - PATIENT PORTAL LINK FT
You can access the FollowMyHealth Patient Portal offered by [facility] by registering at the following website: [email or web address]. By joining [facility]’s FollowMyHealth portal, you will also be able to view your health information using other applications (apps) compatible with our system.

## 2021-04-26 NOTE — DISCHARGE NOTE PROVIDER - NSDCMRMEDTOKEN_GEN_ALL_CORE_FT
albuterol 90 mcg/inh inhalation aerosol: 2 puff(s) inhaled every 6 hours, As Needed  Aspirin Low Dose 81 mg oral delayed release tablet: 1 tab(s) orally once a day  atenolol 25 mg oral tablet: 1 tab(s) orally once a day  Claritin 10 mg oral tablet: 1 tab(s) orally once a day  levothyroxine 112 mcg (0.112 mg) oral tablet: 1 tab(s) orally once a day  levothyroxine 25 mcg (0.025 mg) oral tablet: 1 tab(s) orally every other day    Takes along with 112mcg for a Total dose of 137mcg every other day   metFORMIN 1000 mg oral tablet: 1 tab(s) orally 2 times a day  simvastatin 10 mg oral tablet: 1 tab(s) orally once a day (at bedtime)  Steglatro 15 mg oral tablet: 1 tab(s) orally once a day (in the morning)

## 2021-04-26 NOTE — PROGRESS NOTE ADULT - REASON FOR ADMISSION
generalized weakness, chest pressure

## 2021-04-26 NOTE — PROGRESS NOTE ADULT - SUBJECTIVE AND OBJECTIVE BOX
[facility] -- Benito Underwood, Astrid Washington, Denny Maharaj [place], [place]  Office # [phone]    Follow Up:  Syncope    Subjective/Observations: sitting up at edge of bed.  Denies dizziness or lightheadedness.  Denies chest pain or SOB, DAVILA, or orthopnea.  Ambulates to the      REVIEW OF SYSTEMS: All other review of systems is negative unless indicated above  PAST MEDICAL & SURGICAL HISTORY:  Diabetes  non insulin dependent    High cholesterol    Seasonal allergies    H/O lumpectomy  2007    Post menopausal problems  bleeding post menopause  endometrial thickening, biopsy was normal  s/p D&amp;C    MEDICATIONS  (STANDING):  aspirin enteric coated 81 milliGRAM(s) Oral daily  ATENolol  Tablet 25 milliGRAM(s) Oral daily  dextrose 40% Gel 15 Gram(s) Oral once  dextrose 5%. 1000 milliLiter(s) (50 mL/Hr) IV Continuous <Continuous>  dextrose 5%. 1000 milliLiter(s) (100 mL/Hr) IV Continuous <Continuous>  dextrose 50% Injectable 25 Gram(s) IV Push once  dextrose 50% Injectable 12.5 Gram(s) IV Push once  dextrose 50% Injectable 25 Gram(s) IV Push once  enoxaparin Injectable 40 milliGRAM(s) SubCutaneous daily  glucagon  Injectable 1 milliGRAM(s) IntraMuscular once  insulin lispro (ADMELOG) corrective regimen sliding scale   SubCutaneous three times a day before meals  insulin lispro (ADMELOG) corrective regimen sliding scale   SubCutaneous at bedtime  lactated ringers. 1000 milliLiter(s) (80 mL/Hr) IV Continuous <Continuous>  levothyroxine 112 MICROGram(s) Oral daily  levothyroxine 25 MICROGram(s) Oral <User Schedule>  simvastatin 10 milliGRAM(s) Oral at bedtime    MEDICATIONS  (PRN):  ALBUTerol    90 MICROgram(s) HFA Inhaler 2 Puff(s) Inhalation every 6 hours PRN Shortness of Breath and/or Wheezing    Allergies    No Known Allergies    Intolerances    Vital Signs Last 24 Hrs  T(C): 36.4 (25 Apr 2021 08:08), Max: 37 (24 Apr 2021 20:28)  T(F): 97.5 (25 Apr 2021 08:08), Max: 98.6 (24 Apr 2021 20:28)  HR: 70 (25 Apr 2021 09:33) (60 - 76)  BP: 121/84 (25 Apr 2021 09:33) (111/64 - 138/86)  BP(mean): --  RR: 18 (25 Apr 2021 08:08) (16 - 18)  SpO2: 95% (25 Apr 2021 09:33) (93% - 100%)  I&O's Summary    24 Apr 2021 07:01  -  25 Apr 2021 07:00  --------------------------------------------------------  IN: 1000 mL / OUT: 0 mL / NET: 1000 mL     PHYSICAL EXAM:  TELE: NSR  Constitutional: NAD, awake and alert, obese  HEENT: Moist Mucous Membranes, Anicteric  Pulmonary: Non-labored, breath sounds are clear bilaterally, No wheezing, rales or rhonchi  Cardiovascular: Regular, S1 and S2, No murmurs, rubs, gallops or clicks  Gastrointestinal: Bowel Sounds present, soft, nontender.   Lymph: No peripheral edema. No lymphadenopathy.  Skin: No visible rashes or ulcers.  Psych:  Mood & affect appropriate  LABS: All Labs Reviewed:                        11.7   7.61  )-----------( 244      ( 25 Apr 2021 08:29 )             38.2                         12.1   8.20  )-----------( 255      ( 24 Apr 2021 11:35 )             39.3                         12.7   9.50  )-----------( 258      ( 22 Apr 2021 22:21 )             41.3     25 Apr 2021 08:29    141    |  106    |  14     ----------------------------<  101    4.1     |  28     |  0.69   24 Apr 2021 11:35    141    |  106    |  16     ----------------------------<  180    4.1     |  26     |  0.89   22 Apr 2021 22:21    140    |  105    |  19     ----------------------------<  140    4.3     |  28     |  1.00     Ca    9.2        25 Apr 2021 08:29  Ca    9.0        24 Apr 2021 11:35  Ca    9.4        22 Apr 2021 22:21  Mg     1.9       24 Apr 2021 11:35    TPro  7.3    /  Alb  3.4    /  TBili  0.3    /  DBili  x      /  AST  13     /  ALT  21     /  AlkPhos  99     25 Apr 2021 08:29  TPro  7.6    /  Alb  3.5    /  TBili  0.2    /  DBili  x      /  AST  14     /  ALT  21     /  AlkPhos  103    24 Apr 2021 11:35  TPro  7.9    /  Alb  3.7    /  TBili  0.3    /  DBili  x      /  AST  12     /  ALT  20     /  AlkPhos  107    22 Apr 2021 22:21    CARDIAC MARKERS ( 24 Apr 2021 17:04 )  <.015 ng/mL / x     / 46 U/L / x     / <1.0 ng/mL  CARDIAC MARKERS ( 24 Apr 2021 11:35 )  <.015 ng/mL / x     / x     / x     / x           EXAM:  ECHO TTE WO CON COMP W DOPP         PROCEDURE DATE:  04/24/2021        INTERPRETATION:  INDICATION: Syncope  Sonographer PH    Blood Pressure unavailable    Height 162.6 cm     Weight 86.2 kg       BSA 1.9 sq m    Dimensions:  LA 3.0       Normal Values: 2.0 - 4.0 cm  Ao 3.0        Normal Values: 2.0 - 3.8 cm  SEPTUM 1.2       Normal Values: 0.6 - 1.2 cm  PWT 0.9       Normal Values: 0.6 - 1.1 cm  LVIDd 4.0         Normal Values: 3.0 - 5.6 cm  LVIDs 2.6         Normal Values: 1.8 - 4.0 cm    OBSERVATIONS:  Mitral Valve: normal, mild MR.  Aortic Valve/Aorta: Sclerotic trileaflet aortic valve. Moderate AI  Tricuspid Valve: normal with trace TR.  Pulmonic Valve: Not well-visualized  Left Atrium: normal  Right Atrium: Not well-visualized  Left Ventricle: normal LV size and systolic function, estimated LVEF of 60%.  Right Ventricle: Grossly normal size and systolic function.  Pericardium: no significant pericardial effusion.  Pulmonary/RV Pressure: estimated PA systolic pressure of27 mmHg      IMPRESSION:  Normal left ventricular internal dimensions and systolic function, estimated LVEF of 60%.  Grossly normal RV size and systolic function.  Sclerotic trileaflet aortic valve, moderate AI.  Mild MR  Trace TR.  No significant pericardial effusion.      FLYNN REYNOLDS MD; Attending Cardiologist  This document has been electronically signed. Apr 25 2021  2:27PM       EXAM:  CT BRAIN                          PROCEDURE DATE:  04/22/2021      INTERPRETATION:  CLINICAL INFORMATION: Syncope    TECHNIQUE: Noncontrast axial CT images of the brain were acquired from the base of skull to vertex.    COMPARISON: None.    FINDINGS: No intracranial hemorrhage is seen. The grey-white differentiation is maintained. Mild periventricular and subcortical white matter hypoattenuation without mass effect is noted, non-specific, but likely related to chronic small vessel ischemic changes.    Ventricles and sulci are normal in size and configuration for patient's age. No mass effect or midline shift is seen. Basal cisterns are not effaced.    The visualized paranasal sinuses and tympanomastoid cavities are clear.    Noosseous abnormality seen.    IMPRESSION: No intracranial hemorrhage or mass effect.    CAROLINA SLAUGHTER MD; Attending Radiologist  This document has been electronically signed. Apr 22 2021 11:56PM      EXAM:  CT ANGIO CHEST (W)AW IC                            PROCEDURE DATE:  04/24/2021          INTERPRETATION:  INDICATION, CLINICAL INFORMATION: Syncope, palpitations, chest pressure, dyspnea    TECHNIQUE: CT pulmonary angiogram of the chest was performed. Coronal and sagittal images were reconstructed. Maximum intensity projection images were generated. Images were obtained after the uneventful administration of 76 cc nonionic intravenous Omnipaque 350. 24 cc of Omnipaque 350 was discarded.      COMPARISON: None.    FINDINGS:    PULMONARY VESSELS: No pulmonary embolus. Main pulmonary artery normal in diameter.    HEART AND VASCULATURE: Heart size is normal. No pericardial effusion. No aortic aneurysm or dissection.    LUNGS, AIRWAYS, PLEURA: Patent central airways. Clear lungs. No pleural effusions or pneumothorax.    MEDIASTINUM: No mass or lymphadenopathy.    UPPER ABDOMEN: Within normal limits.    BONES AND SOFT TISSUES: No aggressive osseous lesion.    LOWER NECK: Within normal limits.    IMPRESSION:    No pulmonary embolus.    SHARAN LOREDO MD; Attending Radiologist  This document has been electronically signed. Apr 24 2021  4:04PM    Ventricular Rate 80 BPM    Atrial Rate 80 BPM    P-R Interval 162 ms    QRS Duration 96 ms    Q-T Interval 382 ms    QTC Calculation(Bazett) 440 ms    P Axis 45 degrees    R Axis 8 degrees    T Axis 27 degrees    Diagnosis Line Normal sinus rhythm    Confirmed by Felix MD, Rajat ([id number]) on 4/24/2021 6:01:18 PM  
neuro cons dict  seen for dizziness/loc consistent with syncope  doubt tia/sz  no further neuro work up  dc home
[facility] -- Benito Underwood, Astrid Washington, Denny Maharaj, Heidi Caputo: Office # [phone]    Follow Up:  Syncope     Subjective/Observations: Patient seen and examined. Patient awake, alert, resting in bed. No complaints of chest pain, dyspnea, palpitations or dizziness. No signs of orthopnea or PND. Tolerating room air.     REVIEW OF SYSTEMS: All review of systems is negative for eye, ENT, GI, , allergic, dermatologic, musculoskeletal and neurologic except as described above    PAST MEDICAL & SURGICAL HISTORY:  Diabetes  non insulin dependent    High cholesterol    Seasonal allergies    H/O lumpectomy  2007    Post menopausal problems  bleeding post menopause  endometrial thickening, biopsy was normal  s/p D&amp;C        MEDICATIONS  (STANDING):  aspirin enteric coated 81 milliGRAM(s) Oral daily  ATENolol  Tablet 25 milliGRAM(s) Oral daily  dextrose 40% Gel 15 Gram(s) Oral once  dextrose 5%. 1000 milliLiter(s) (50 mL/Hr) IV Continuous <Continuous>  dextrose 5%. 1000 milliLiter(s) (100 mL/Hr) IV Continuous <Continuous>  dextrose 50% Injectable 25 Gram(s) IV Push once  dextrose 50% Injectable 12.5 Gram(s) IV Push once  dextrose 50% Injectable 25 Gram(s) IV Push once  enoxaparin Injectable 40 milliGRAM(s) SubCutaneous daily  glucagon  Injectable 1 milliGRAM(s) IntraMuscular once  insulin lispro (ADMELOG) corrective regimen sliding scale   SubCutaneous three times a day before meals  insulin lispro (ADMELOG) corrective regimen sliding scale   SubCutaneous at bedtime  lactated ringers. 1000 milliLiter(s) (80 mL/Hr) IV Continuous <Continuous>  levothyroxine 112 MICROGram(s) Oral daily  levothyroxine 25 MICROGram(s) Oral <User Schedule>  simvastatin 10 milliGRAM(s) Oral at bedtime    MEDICATIONS  (PRN):  ALBUTerol    90 MICROgram(s) HFA Inhaler 2 Puff(s) Inhalation every 6 hours PRN Shortness of Breath and/or Wheezing    Allergies    No Known Allergies    Intolerances      Vital Signs Last 24 Hrs  T(C): 36.7 (26 Apr 2021 11:17), Max: 36.7 (26 Apr 2021 04:38)  T(F): 98 (26 Apr 2021 11:17), Max: 98 (26 Apr 2021 04:38)  HR: 74 (26 Apr 2021 11:17) (59 - 74)  BP: 109/76 (26 Apr 2021 11:17) (109/76 - 122/74)  BP(mean): --  RR: 18 (26 Apr 2021 11:17) (17 - 18)  SpO2: 98% (26 Apr 2021 11:17) (94% - 98%)  I&O's Summary        TELE:  50-90s   PHYSICAL EXAM:  Appearance: NAD, no distress, alert, Obese   HEENT: Moist Mucous Membranes, Anicteric  Cardiovascular: Regular rate and rhythm, Normal S1 S2, No JVD, + murmurs, No rubs, gallops or clicks  Respiratory: Non-labored, Clear to auscultation, No rales, No rhonchi, No wheezing.   Gastrointestinal:  Soft, Non-tender, + BS  Neurologic: Non-focal  Skin: Warm and dry, No visible rashes or ulcers, No ecchymosis, No cyanosis  Musculoskeletal: No clubbing, No cyanosis, No joint swelling/tenderness  Psychiatry: Mood & affect appropriate  Lymph: No peripheral edema.     LABS: All Labs Reviewed:                        11.7   7.61  )-----------( 244      ( 25 Apr 2021 08:29 )             38.2                         12.1   8.20  )-----------( 255      ( 24 Apr 2021 11:35 )             39.3     25 Apr 2021 08:29    141    |  106    |  14     ----------------------------<  101    4.1     |  28     |  0.69   24 Apr 2021 11:35    141    |  106    |  16     ----------------------------<  180    4.1     |  26     |  0.89     Ca    9.2        25 Apr 2021 08:29  Ca    9.0        24 Apr 2021 11:35  Mg     1.9       24 Apr 2021 11:35    TPro  7.3    /  Alb  3.4    /  TBili  0.3    /  DBili  x      /  AST  13     /  ALT  21     /  AlkPhos  99     25 Apr 2021 08:29  TPro  7.6    /  Alb  3.5    /  TBili  0.2    /  DBili  x      /  AST  14     /  ALT  21     /  AlkPhos  103    24 Apr 2021 11:35  Creatine Kinase, Serum: 46 U/L (04-24-21 @ 17:04)  Troponin I, Serum: <.015 ng/mL (04-24-21 @ 17:04)  Troponin I, Serum: <.015 ng/mL (04-24-21 @ 11:35)  D-Dimer Assay, Quantitative: <150 ng/mL DDU (04-24-21 @ 11:35)  Cholesterol, Serum: 145 mg/dL (04-25-21 @ 10:48)  HDL Cholesterol, Serum: 44 mg/dL (04-25-21 @ 10:48)  Triglycerides, Serum: 98 mg/dL (04-25-21 @ 10:48)    12 Lead ECG:   Ventricular Rate 80 BPM  Atrial Rate 80 BPM  P-R Interval 162 ms  QRS Duration 96 ms  Q-T Interval 382 ms  QTC Calculation(Bazett) 440 ms  P Axis 45 degrees  R Axis 8 degrees  T Axis 27 degrees  Diagnosis Line Normal sinus rhythm  Confirmed by Rajat Washington MD ([id number]) on 4/24/2021 6:01:18 PM (04-24-21 @ 10:56)    < from: TTE Echo Complete w/o Contrast w/ Doppler (04.24.21 @ 14:06) >  Dimensions:  LA 3.0       Normal Values: 2.0 - 4.0 cm  Ao 3.0        Normal Values: 2.0 - 3.8 cm  SEPTUM 1.2       Normal Values: 0.6 - 1.2 cm  PWT 0.9       Normal Values: 0.6 - 1.1 cm  LVIDd 4.0         Normal Values: 3.0 - 5.6 cm  LVIDs 2.6         Normal Values: 1.8 - 4.0 cm    OBSERVATIONS:  Mitral Valve: normal, mild MR.  Aortic Valve/Aorta: Sclerotic trileaflet aortic valve. Moderate AI  Tricuspid Valve: normal with trace TR.  Pulmonic Valve: Not well-visualized  Left Atrium: normal  Right Atrium: Not well-visualized  Left Ventricle: normal LV size and systolic function, estimated LVEF of 60%.  Right Ventricle: Grossly normal size and systolic function.  Pericardium: no significant pericardial effusion.  Pulmonary/RV Pressure: estimated PA systolic pressure of27 mmHg    IMPRESSION:  Normal left ventricular internal dimensions and systolic function, estimated LVEF of 60%.  Grossly normal RV size and systolic function.  Sclerotic trileaflet aortic valve, moderate AI.  Mild MR  Trace TR.  No significant pericardial effusion.    < end of copied text >
Patient is a 60y old  Female who presents with a chief complaint of chest pain (24 Apr 2021 13:20)        HPI:  59yo female with past medical history of hypertension, diabetes mellitus, hypothyroidism present with complain of chest pressure, generalized weakness. Patient states she was seen in \[facility]\"" ED on 4/22 due to a syncopal episode. She reports sitting with her grandchild and family, then all of a sudden felt lightheaded, and weak and slipped off to the ground. Per son (who was at bedside), pt had LOC for few seconds and when she opened her eyes she asked what happened, this was followed by by about 1 minue of not responding verbally but she did nod her head to show she could hear her name being called. They  her and took her to the chair, and she was so weak that she couldnt sit up. EMS was called , BG was checked, per patient it was about 250 at the time. She was evaluated in ED that day. Of note, son said medical staff offered admission for observation but patient wanted to go home. Pt denied any involuntary movement or urinary incontinence at that time.   In addition, she had been experiencing mild dyspnea on exertion for the past 3-4 days prior to today (no SOB at rest). She also had an episode of chest pressure/discomfort 2 days prior to the syncopal episode that resolved on its own.   Today, she experienced chest pressure at around 7am while lying down at rest. The pressure was located in midsternum, non radiating. Later on, she experienced palpitations after taking few steps around the home (also resolved on its own). She also c/o dyspnea with exertion, lightheadedness and weakness. She called her brother who is a doctor in [place] and he advised her to fo to hospital. Of note, pt is currently undergoing daily fasting due to Jain belief; she has no intake for about 12hrs in day time, breaks the fast at sundown. Pt reports angiography 2 yrs ago at [facility] but required no stent. She has been on baby aspirin since then.   Denies recent travel, prolonged immobility. She is independent with ADLs and iADLs.     ED course: VSS, trop negative. (24 Apr 2021 12:57)      SUBJECTIVE & OBJECTIVE: Pt seen and examined at bedside.     PHYSICAL EXAM:  T(C): 36.6 (04-25-21 @ 05:22), Max: 37 (04-24-21 @ 20:28)  HR: 70 (04-25-21 @ 09:33) (67 - 84)  BP: 121/84 (04-25-21 @ 09:33) (111/64 - 138/86)  RR: 18 (04-25-21 @ 05:22) (15 - 18)  SpO2: 95% (04-25-21 @ 09:33) (95% - 100%)  Wt(kg): --   Weight (kg): 86.2 (04-24 @ 10:46)  GENERAL: NAD, well-groomed, well-developed  HEAD:  Atraumatic, Normocephalic  NERVOUS SYSTEM:  Alert & Oriented X3,  CHEST/LUNG: Clear to auscultation bilaterally; No rales, rhonchi, wheezing, or rubs  HEART: Regular rate and rhythm; No murmurs, rubs, or gallops  ABDOMEN: Soft, Nontender, Nondistended; Bowel sounds present  EXTREMITIES:  2+ Peripheral Pulses, No clubbing, cyanosis, or edema        MEDICATIONS  (STANDING):  aspirin enteric coated 81 milliGRAM(s) Oral daily  ATENolol  Tablet 25 milliGRAM(s) Oral daily  dextrose 40% Gel 15 Gram(s) Oral once  dextrose 5%. 1000 milliLiter(s) (50 mL/Hr) IV Continuous <Continuous>  dextrose 5%. 1000 milliLiter(s) (100 mL/Hr) IV Continuous <Continuous>  dextrose 50% Injectable 25 Gram(s) IV Push once  dextrose 50% Injectable 12.5 Gram(s) IV Push once  dextrose 50% Injectable 25 Gram(s) IV Push once  enoxaparin Injectable 40 milliGRAM(s) SubCutaneous daily  glucagon  Injectable 1 milliGRAM(s) IntraMuscular once  insulin lispro (ADMELOG) corrective regimen sliding scale   SubCutaneous three times a day before meals  insulin lispro (ADMELOG) corrective regimen sliding scale   SubCutaneous at bedtime  lactated ringers. 1000 milliLiter(s) (80 mL/Hr) IV Continuous <Continuous>  levothyroxine 112 MICROGram(s) Oral daily  levothyroxine 25 MICROGram(s) Oral <User Schedule>  simvastatin 10 milliGRAM(s) Oral at bedtime    MEDICATIONS  (PRN):  ALBUTerol    90 MICROgram(s) HFA Inhaler 2 Puff(s) Inhalation every 6 hours PRN Shortness of Breath and/or Wheezing      LABS:                        11.7   7.61  )-----------( 244      ( 25 Apr 2021 08:29 )             38.2     04-25    141  |  106  |  14  ----------------------------<  101<H>  4.1   |  28  |  0.69    Ca    9.2      25 Apr 2021 08:29  Mg     1.9     04-24    TPro  7.3  /  Alb  3.4  /  TBili  0.3  /  DBili  x   /  AST  13<L>  /  ALT  21  /  AlkPhos  99  04-25        Magnesium, Serum: 1.9 mg/dL (04-24 @ 11:35)    CAPILLARY BLOOD GLUCOSE      POCT Blood Glucose.: 140 mg/dL (25 Apr 2021 07:57)  POCT Blood Glucose.: 125 mg/dL (24 Apr 2021 21:53)  POCT Blood Glucose.: 112 mg/dL (24 Apr 2021 16:38)      CAPILLARY BLOOD GLUCOSE      POCT Blood Glucose.: 140 mg/dL (25 Apr 2021 07:57)  POCT Blood Glucose.: 125 mg/dL (24 Apr 2021 21:53)  POCT Blood Glucose.: 112 mg/dL (24 Apr 2021 16:38)    CAPILLARY BLOOD GLUCOSE      POCT Blood Glucose.: 140 mg/dL (25 Apr 2021 07:57)      CARDIAC MARKERS ( 24 Apr 2021 17:04 )  <.015 ng/mL / x     / 46 U/L / x     / <1.0 ng/mL  CARDIAC MARKERS ( 24 Apr 2021 11:35 )  <.015 ng/mL / x     / x     / x     / x            RECENT CULTURES:      RADIOLOGY & ADDITIONAL TESTS:                        DVT/GI ppx  Discussed with pt @ bedside

## 2021-04-26 NOTE — PROGRESS NOTE ADULT - ATTENDING COMMENTS
I personally saw and examined the patient in detail.  I have spoken to the above provider regarding the assessment and plan of care.  I reviewed the above assessment and plan of care, and agree.  I have made changes in the body of the note where appropriate.
Chart reviewed    Patient seen and examined    Agree with plan as outlined above    61 y/o F with non-obstructive CAD, HTN, HLD, T2DM presented initially to the ED 2 days ago after a syncopal episode at home.  iAt that time, she was fasting and was on her 8th day.  Had dinner and was feeding her grandson, found to have fell on the floor hitting her head and right side.  Denies Hx of syncope or seizure disorder.  Denies any prodrome.  While she was in the ED, she reported that she has been having persistent chest heaviness with pain on her left upper back that started 2 days prior to the fall.  All w/u were negative and was allowed to go home with her insistence.  Today, she presented to the ED c/o weakness, SOB, and chest heaviness again.    Syncope s/p Fall  - Has no know syncope or seizure disorder  - No evidence of hypotension or bradycardia  - No acute ischemic changes on EKG  - She has a known cardiac murmur and follows with Dr. Cindy Tapia, though, no TTE recently.   - She has an audible murmur on exam of at least mild-moderate in significance  - TTE here showed normal RVF.LVF, EF 65% with mod AI.  No other valvular pathology  - Please, obtain orthostatic VS daily  - Monitor on tele to assess for any arrhythmias  - CT head negative.  CUS negative.  Called Neuro consult, spoke with Dr. Ventura    CAD/Unstable Angina  - Admits to have had cardiac cath 2 years ago with no intervention  - Has been having chest heaviness radiating to her left upper back, SOB/DAVILA.  CTPA negative of PE  - All CE's negative  - Continue ASA and statin    HTN  - Remains stable and controlled.  Not on home anti-HTN med  - home Atenolol restarted

## 2021-04-27 PROBLEM — I65.29 OCCLUSION AND STENOSIS OF UNSPECIFIED CAROTID ARTERY: Chronic | Status: ACTIVE | Noted: 2018-12-27

## 2021-04-27 PROBLEM — E78.5 HYPERLIPIDEMIA, UNSPECIFIED: Chronic | Status: ACTIVE | Noted: 2018-12-27

## 2021-05-27 PROBLEM — Z00.00 ENCOUNTER FOR PREVENTIVE HEALTH EXAMINATION: Status: ACTIVE | Noted: 2021-05-27

## 2021-06-02 ENCOUNTER — APPOINTMENT (OUTPATIENT)
Dept: CARDIOLOGY | Facility: CLINIC | Age: 61
End: 2021-06-02

## 2021-07-15 ENCOUNTER — OUTPATIENT (OUTPATIENT)
Dept: OUTPATIENT SERVICES | Facility: HOSPITAL | Age: 61
LOS: 1 days | End: 2021-07-15
Payer: MEDICAID

## 2021-07-15 VITALS
HEART RATE: 66 BPM | RESPIRATION RATE: 15 BRPM | SYSTOLIC BLOOD PRESSURE: 124 MMHG | DIASTOLIC BLOOD PRESSURE: 66 MMHG | OXYGEN SATURATION: 97 %

## 2021-07-15 VITALS
RESPIRATION RATE: 16 BRPM | OXYGEN SATURATION: 99 % | DIASTOLIC BLOOD PRESSURE: 85 MMHG | SYSTOLIC BLOOD PRESSURE: 144 MMHG | TEMPERATURE: 98 F | HEART RATE: 76 BPM | WEIGHT: 192.02 LBS | HEIGHT: 64 IN

## 2021-07-15 DIAGNOSIS — I25.10 ATHEROSCLEROTIC HEART DISEASE OF NATIVE CORONARY ARTERY WITHOUT ANGINA PECTORIS: ICD-10-CM

## 2021-07-15 DIAGNOSIS — N95.9 UNSPECIFIED MENOPAUSAL AND PERIMENOPAUSAL DISORDER: Chronic | ICD-10-CM

## 2021-07-15 DIAGNOSIS — Z98.890 OTHER SPECIFIED POSTPROCEDURAL STATES: Chronic | ICD-10-CM

## 2021-07-15 LAB
ALBUMIN SERPL ELPH-MCNC: 4.5 G/DL — SIGNIFICANT CHANGE UP (ref 3.3–5)
ALP SERPL-CCNC: 108 U/L — SIGNIFICANT CHANGE UP (ref 40–120)
ALT FLD-CCNC: 17 U/L — SIGNIFICANT CHANGE UP (ref 10–45)
ANION GAP SERPL CALC-SCNC: 13 MMOL/L — SIGNIFICANT CHANGE UP (ref 5–17)
AST SERPL-CCNC: 16 U/L — SIGNIFICANT CHANGE UP (ref 10–40)
BILIRUB SERPL-MCNC: 0.2 MG/DL — SIGNIFICANT CHANGE UP (ref 0.2–1.2)
BUN SERPL-MCNC: 14 MG/DL — SIGNIFICANT CHANGE UP (ref 7–23)
CALCIUM SERPL-MCNC: 9.8 MG/DL — SIGNIFICANT CHANGE UP (ref 8.4–10.5)
CHLORIDE SERPL-SCNC: 102 MMOL/L — SIGNIFICANT CHANGE UP (ref 96–108)
CO2 SERPL-SCNC: 23 MMOL/L — SIGNIFICANT CHANGE UP (ref 22–31)
CREAT SERPL-MCNC: 0.73 MG/DL — SIGNIFICANT CHANGE UP (ref 0.5–1.3)
GLUCOSE SERPL-MCNC: 131 MG/DL — HIGH (ref 70–99)
HCT VFR BLD CALC: 39.4 % — SIGNIFICANT CHANGE UP (ref 34.5–45)
HGB BLD-MCNC: 12.1 G/DL — SIGNIFICANT CHANGE UP (ref 11.5–15.5)
MCHC RBC-ENTMCNC: 24.3 PG — LOW (ref 27–34)
MCHC RBC-ENTMCNC: 30.7 GM/DL — LOW (ref 32–36)
MCV RBC AUTO: 79.1 FL — LOW (ref 80–100)
NRBC # BLD: 0 /100 WBCS — SIGNIFICANT CHANGE UP (ref 0–0)
PLATELET # BLD AUTO: 258 K/UL — SIGNIFICANT CHANGE UP (ref 150–400)
POTASSIUM SERPL-MCNC: 4.2 MMOL/L — SIGNIFICANT CHANGE UP (ref 3.5–5.3)
POTASSIUM SERPL-SCNC: 4.2 MMOL/L — SIGNIFICANT CHANGE UP (ref 3.5–5.3)
PROT SERPL-MCNC: 7.5 G/DL — SIGNIFICANT CHANGE UP (ref 6–8.3)
RBC # BLD: 4.98 M/UL — SIGNIFICANT CHANGE UP (ref 3.8–5.2)
RBC # FLD: 17.6 % — HIGH (ref 10.3–14.5)
SODIUM SERPL-SCNC: 138 MMOL/L — SIGNIFICANT CHANGE UP (ref 135–145)
WBC # BLD: 7.34 K/UL — SIGNIFICANT CHANGE UP (ref 3.8–10.5)
WBC # FLD AUTO: 7.34 K/UL — SIGNIFICANT CHANGE UP (ref 3.8–10.5)

## 2021-07-15 PROCEDURE — C1894: CPT

## 2021-07-15 PROCEDURE — 80053 COMPREHEN METABOLIC PANEL: CPT

## 2021-07-15 PROCEDURE — C1769: CPT

## 2021-07-15 PROCEDURE — 93458 L HRT ARTERY/VENTRICLE ANGIO: CPT

## 2021-07-15 PROCEDURE — C1887: CPT

## 2021-07-15 PROCEDURE — 93010 ELECTROCARDIOGRAM REPORT: CPT

## 2021-07-15 PROCEDURE — 93005 ELECTROCARDIOGRAM TRACING: CPT

## 2021-07-15 PROCEDURE — 99152 MOD SED SAME PHYS/QHP 5/>YRS: CPT

## 2021-07-15 PROCEDURE — 85027 COMPLETE CBC AUTOMATED: CPT

## 2021-07-15 RX ORDER — LEVOTHYROXINE SODIUM 125 MCG
1 TABLET ORAL
Qty: 0 | Refills: 0 | DISCHARGE

## 2021-07-15 RX ORDER — ERTUGLIFLOZIN 5 MG/1
1 TABLET, FILM COATED ORAL
Qty: 0 | Refills: 0 | DISCHARGE

## 2021-07-15 RX ORDER — METFORMIN HYDROCHLORIDE 850 MG/1
1 TABLET ORAL
Qty: 0 | Refills: 0 | DISCHARGE

## 2021-07-15 RX ORDER — SIMVASTATIN 20 MG/1
1 TABLET, FILM COATED ORAL
Qty: 0 | Refills: 0 | DISCHARGE

## 2021-07-15 NOTE — H&P CARDIOLOGY - HISTORY OF PRESENT ILLNESS
61 year old [place] female with PMH of non occlusive carotid artery disease,  HTN, HLD, hypothyroidism, DMT2-well controlled without complication and followed by Dr Sarkar PCP, and obesity presents today for cardiac catheterization for evaluation of syncope.  Pt. states she had syncopal episode during Ramadan while fasting where her body went limp and she fell.  Family stated she was unresponsive for several minutes.  Two days prior she had episode of chest pressures with some DAVILA and palpitations when climbing stairs.  Denies diaphoresis, nausea, vomiting, peripheral edema, or recent weight gain.  No implanted monitoring devices. NST 5/27/2021 EF 74% medium sized anterior mild ischemia in the basal to mid anterior wall segments. Significant breast attenuation.  Pt. presents asymptomatic for further evaluation and cardiac cath.

## 2021-07-15 NOTE — H&P CARDIOLOGY - PMH
Carotid stenosis    Diabetes mellitus    HLD (hyperlipidemia)    Hypertension    Hypothyroid    Obesity

## 2021-07-15 NOTE — ASU DISCHARGE PLAN (ADULT/PEDIATRIC) - CARE PROVIDER_API CALL
Daren Ku (DO)  Cardiology; Interventional Cardiology  [address], [address]  [place] [address]  Phone: [phone]  Fax: [phone]  Follow Up Time: 2 weeks

## 2022-01-19 ENCOUNTER — EMERGENCY (EMERGENCY)
Facility: HOSPITAL | Age: 62
LOS: 1 days | Discharge: HOME | End: 2022-01-19
Attending: STUDENT IN AN ORGANIZED HEALTH CARE EDUCATION/TRAINING PROGRAM | Admitting: STUDENT IN AN ORGANIZED HEALTH CARE EDUCATION/TRAINING PROGRAM
Payer: MEDICAID

## 2022-01-19 VITALS
DIASTOLIC BLOOD PRESSURE: 86 MMHG | HEIGHT: 64 IN | HEART RATE: 75 BPM | WEIGHT: 192.02 LBS | RESPIRATION RATE: 18 BRPM | TEMPERATURE: 98 F | SYSTOLIC BLOOD PRESSURE: 148 MMHG | OXYGEN SATURATION: 100 %

## 2022-01-19 VITALS
DIASTOLIC BLOOD PRESSURE: 63 MMHG | SYSTOLIC BLOOD PRESSURE: 134 MMHG | OXYGEN SATURATION: 99 % | RESPIRATION RATE: 16 BRPM | TEMPERATURE: 98 F | HEART RATE: 71 BPM

## 2022-01-19 DIAGNOSIS — Z98.890 OTHER SPECIFIED POSTPROCEDURAL STATES: Chronic | ICD-10-CM

## 2022-01-19 DIAGNOSIS — N95.9 UNSPECIFIED MENOPAUSAL AND PERIMENOPAUSAL DISORDER: Chronic | ICD-10-CM

## 2022-01-19 LAB
ALBUMIN SERPL ELPH-MCNC: 3.3 G/DL — SIGNIFICANT CHANGE UP (ref 3.3–5)
ALP SERPL-CCNC: 95 U/L — SIGNIFICANT CHANGE UP (ref 40–120)
ALT FLD-CCNC: 25 U/L — SIGNIFICANT CHANGE UP (ref 12–78)
ANION GAP SERPL CALC-SCNC: 6 MMOL/L — SIGNIFICANT CHANGE UP (ref 5–17)
AST SERPL-CCNC: 14 U/L — LOW (ref 15–37)
BASOPHILS # BLD AUTO: 0.07 K/UL — SIGNIFICANT CHANGE UP (ref 0–0.2)
BASOPHILS NFR BLD AUTO: 0.8 % — SIGNIFICANT CHANGE UP (ref 0–2)
BILIRUB SERPL-MCNC: 0.2 MG/DL — SIGNIFICANT CHANGE UP (ref 0.2–1.2)
BUN SERPL-MCNC: 14 MG/DL — SIGNIFICANT CHANGE UP (ref 7–23)
CALCIUM SERPL-MCNC: 9.2 MG/DL — SIGNIFICANT CHANGE UP (ref 8.5–10.1)
CHLORIDE SERPL-SCNC: 110 MMOL/L — HIGH (ref 96–108)
CK MB BLD-MCNC: <1.5 % — SIGNIFICANT CHANGE UP (ref 0–3.5)
CK MB CFR SERPL CALC: <1 NG/ML — SIGNIFICANT CHANGE UP (ref 0–3.6)
CK SERPL-CCNC: 65 U/L — SIGNIFICANT CHANGE UP (ref 26–192)
CO2 SERPL-SCNC: 25 MMOL/L — SIGNIFICANT CHANGE UP (ref 22–31)
CREAT SERPL-MCNC: 0.87 MG/DL — SIGNIFICANT CHANGE UP (ref 0.5–1.3)
EOSINOPHIL # BLD AUTO: 0.24 K/UL — SIGNIFICANT CHANGE UP (ref 0–0.5)
EOSINOPHIL NFR BLD AUTO: 2.9 % — SIGNIFICANT CHANGE UP (ref 0–6)
GLUCOSE SERPL-MCNC: 125 MG/DL — HIGH (ref 70–99)
HCT VFR BLD CALC: 37.9 % — SIGNIFICANT CHANGE UP (ref 34.5–45)
HGB BLD-MCNC: 11.8 G/DL — SIGNIFICANT CHANGE UP (ref 11.5–15.5)
IMM GRANULOCYTES NFR BLD AUTO: 0.2 % — SIGNIFICANT CHANGE UP (ref 0–1.5)
LYMPHOCYTES # BLD AUTO: 2.48 K/UL — SIGNIFICANT CHANGE UP (ref 1–3.3)
LYMPHOCYTES # BLD AUTO: 29.7 % — SIGNIFICANT CHANGE UP (ref 13–44)
MCHC RBC-ENTMCNC: 24.9 PG — LOW (ref 27–34)
MCHC RBC-ENTMCNC: 31.1 GM/DL — LOW (ref 32–36)
MCV RBC AUTO: 80 FL — SIGNIFICANT CHANGE UP (ref 80–100)
MONOCYTES # BLD AUTO: 0.5 K/UL — SIGNIFICANT CHANGE UP (ref 0–0.9)
MONOCYTES NFR BLD AUTO: 6 % — SIGNIFICANT CHANGE UP (ref 2–14)
NEUTROPHILS # BLD AUTO: 5.03 K/UL — SIGNIFICANT CHANGE UP (ref 1.8–7.4)
NEUTROPHILS NFR BLD AUTO: 60.4 % — SIGNIFICANT CHANGE UP (ref 43–77)
NRBC # BLD: 0 /100 WBCS — SIGNIFICANT CHANGE UP (ref 0–0)
PLATELET # BLD AUTO: 301 K/UL — SIGNIFICANT CHANGE UP (ref 150–400)
POTASSIUM SERPL-MCNC: 4.2 MMOL/L — SIGNIFICANT CHANGE UP (ref 3.5–5.3)
POTASSIUM SERPL-SCNC: 4.2 MMOL/L — SIGNIFICANT CHANGE UP (ref 3.5–5.3)
PROT SERPL-MCNC: 7.1 G/DL — SIGNIFICANT CHANGE UP (ref 6–8.3)
RBC # BLD: 4.74 M/UL — SIGNIFICANT CHANGE UP (ref 3.8–5.2)
RBC # FLD: 16.8 % — HIGH (ref 10.3–14.5)
SODIUM SERPL-SCNC: 141 MMOL/L — SIGNIFICANT CHANGE UP (ref 135–145)
TROPONIN I, HIGH SENSITIVITY RESULT: 12.3 NG/L — SIGNIFICANT CHANGE UP
WBC # BLD: 8.34 K/UL — SIGNIFICANT CHANGE UP (ref 3.8–10.5)
WBC # FLD AUTO: 8.34 K/UL — SIGNIFICANT CHANGE UP (ref 3.8–10.5)

## 2022-01-19 PROCEDURE — 70450 CT HEAD/BRAIN W/O DYE: CPT | Mod: MA

## 2022-01-19 PROCEDURE — 80053 COMPREHEN METABOLIC PANEL: CPT

## 2022-01-19 PROCEDURE — 99285 EMERGENCY DEPT VISIT HI MDM: CPT

## 2022-01-19 PROCEDURE — 99284 EMERGENCY DEPT VISIT MOD MDM: CPT | Mod: 25

## 2022-01-19 PROCEDURE — 36415 COLL VENOUS BLD VENIPUNCTURE: CPT

## 2022-01-19 PROCEDURE — 93005 ELECTROCARDIOGRAM TRACING: CPT

## 2022-01-19 PROCEDURE — 70498 CT ANGIOGRAPHY NECK: CPT | Mod: 26,MA

## 2022-01-19 PROCEDURE — 82553 CREATINE MB FRACTION: CPT

## 2022-01-19 PROCEDURE — 70450 CT HEAD/BRAIN W/O DYE: CPT | Mod: 26,59,MA

## 2022-01-19 PROCEDURE — 82550 ASSAY OF CK (CPK): CPT

## 2022-01-19 PROCEDURE — 84484 ASSAY OF TROPONIN QUANT: CPT

## 2022-01-19 PROCEDURE — 70496 CT ANGIOGRAPHY HEAD: CPT | Mod: MA

## 2022-01-19 PROCEDURE — 70498 CT ANGIOGRAPHY NECK: CPT | Mod: MA

## 2022-01-19 PROCEDURE — 71045 X-RAY EXAM CHEST 1 VIEW: CPT

## 2022-01-19 PROCEDURE — 70496 CT ANGIOGRAPHY HEAD: CPT | Mod: 26,MA

## 2022-01-19 PROCEDURE — 85025 COMPLETE CBC W/AUTO DIFF WBC: CPT

## 2022-01-19 PROCEDURE — 93010 ELECTROCARDIOGRAM REPORT: CPT

## 2022-01-19 PROCEDURE — 71045 X-RAY EXAM CHEST 1 VIEW: CPT | Mod: 26

## 2022-01-19 NOTE — ED PROVIDER NOTE - CHPI ED SYMPTOMS NEG
no blurred vision/no confusion/no dizziness/no fever/no loss of consciousness/no nausea/no vomiting/no change in level of consciousness

## 2022-01-19 NOTE — ED PROVIDER NOTE - PROGRESS NOTE DETAILS
Results of work up discussed with patient and copies of all reports given.  /63, patient states she is feeling much better.  No evidence if SAH on CTA.  Patient advised PMD and cardiology follow up.

## 2022-01-19 NOTE — ED ADULT NURSE NOTE - OBJECTIVE STATEMENT
Patient complaining of headache and high blood pressure at home which woke her up from sleep with numbness to her left arm took tylenol and extra dose of her lisinopril as advised by her cardiologist brother and was advised to go to ED was seen and evaluated by MD with orders made and carried out EKG done attached to cardiac monitor.

## 2022-01-19 NOTE — ED PROVIDER NOTE - CARE PROVIDER_API CALL
Leyla Shepherd)  Internal Medicine  [address] [place] [address]  Phone: [phone]  Fax: [phone]  Follow Up Time:     Danny Maharaj)  Cardio [facility]  [address] [place] [address]  Phone: [phone]  Fax: [phone]  Follow Up Time:

## 2022-01-19 NOTE — ED PROVIDER NOTE - OBJECTIVE STATEMENT
61 year old female with a history of DM, HTN, HLD presents with headache and HTN.  Patient states that she woke up at midnight with a diffuse pounding headache.  She took her BP at home and the machine read 196/106.  Denies chest pain, blurry vision, vomiting.  The pain radiated to her left neck and she felt as though her left arm/hand was numb but she was able to move and use it.  She called a physician family friend who advised her to take an extra 1/2 dose of her Lisinopril (5mg) which she took around 2:15am with significant relief of her symptoms.  Currently her headache is 3/10, no neck pain and the numbness in her LUE  has resolved.  Patient recalls a similar headache 6 months ago but she vomited at that time.  PMD Dr. Shepherd

## 2022-01-19 NOTE — ED PROVIDER NOTE - PATIENT PORTAL LINK FT
You can access the FollowMyHealth Patient Portal offered by [facility] by registering at the following website: [email or web address]. By joining [employer]’s FollowMyHealth portal, you will also be able to view your health information using other applications (apps) compatible with our system.

## 2022-01-19 NOTE — ED PROVIDER NOTE - NSFOLLOWUPINSTRUCTIONS_ED_ALL_ED_FT
Please follow up with your primary care doctor and cardiologist.  Take your blood pressure medication as prescribed.  Return to the ER for persistent headache, vomiting, chest pain, neck pain, blurry vision, or any other concerns.       Hypertensive Crisis    WHAT YOU NEED TO KNOW:    A hypertensive crisis is a sudden spike in blood pressure to 180/120 or higher. A normal blood pressure is 119/79 or lower. A hypertensive crisis is also known as acute hypertension. This is a medical emergency that could lead to organ damage or be life-threatening.    Blood Pressure Readings         DISCHARGE INSTRUCTIONS:    Call 911 for any of the following:   •You have chest      •You have back pain or shortness of breath.      •You have weakness or numbness in your face, arms, or legs.      •You cannot see or talk as well as usual.      Return to the emergency department if:   •You have a severe headache.          Contact your healthcare provider if:   •Your blood pressure is 180/110 or higher but you have no other symptoms.       •You have questions or concerns about your condition or care.      Medicines: You may need any of the following:  •Blood pressure medicine is given to lower your blood pressure. There are many different types of blood pressure medicine, and you may need more than one type. It is very important to take your blood pressure medicine exactly as directed. Skipped doses can lead to a hypertensive crisis. Talk to your healthcare provider if you are having side effects from your medicine. Do not stop taking it without talking to your healthcare provider.      •Diuretics help decrease extra fluid that collects in your blood vessels. This lowers your blood pressure by reducing pressure in your arteries. Diuretics are often called water pills. You may urinate more often while you take this medicine.       •Take your medicine as directed. Contact your healthcare provider if you think your medicine is not helping or if you have side effects. Tell him of her if you are allergic to any medicine. Keep a list of the medicines, vitamins, and herbs you take. Include the amounts, and when and why you take them. Bring the list or the pill bottles to follow-up visits. Carry your medicine list with you in case of an emergency.      Follow up with your healthcare provider within 1 to 5 days or as directed: You will need to return to have your blood pressure checked and other tests. Your healthcare provider may also refer to you a cardiologist. Write down your questions so you remember to ask them during your visits.    Prevent another hypertensive crisis:   •Check your blood pressure at home. Sit and rest for 5 minutes before you take your blood pressure. Extend your arm and support it on a flat surface. Your arm should be at the same level as your heart. Follow the directions that came with your blood pressure monitor. If possible, take at least 2 blood pressure readings each time. Take your blood pressure at least twice a day at the same times each day, such as morning and evening. Keep a record of your readings and bring it to your follow-up visits. Ask your healthcare provider what your blood pressure should be.   How to take a Blood Pressure           •Manage any other health conditions you have. Health conditions such as diabetes can increase your risk for hypertension. Follow your healthcare provider's instructions and take all your medicines as directed.       •Ask about all medicines. Certain medicines can increase your blood pressure. Examples include oral birth control pills, decongestants, herbal supplements, and NSAIDs, such as ibuprofen. Your healthcare provider can tell you which medicines are safe for you to take. This includes prescription and over-the-counter medicines.      •Limit sodium (salt) as directed. Too much sodium can affect your fluid balance. Check labels to find low-sodium or no-salt-added foods. Some low-sodium foods use potassium salts for flavor. Too much potassium can also cause health problems. Your healthcare provider will tell you how much sodium and potassium are safe for you to have in a day. He or she may recommend that you limit sodium to 2,300 mg a day.             •Follow the meal plan recommended by your healthcare provider. A dietitian or your provider can give you more information on low-sodium plans or the DASH (Dietary Approaches to Stop Hypertension) eating plan. The DASH plan is low in sodium, unhealthy fats, and total fat. It is high in potassium, calcium, and fiber.              •Exercise to maintain a healthy weight. Exercise at least 30 minutes per day, on most days of the week. This will help decrease your blood pressure. Ask your healthcare provider about the best exercise plan for you.   Walking for Exercise           •Decrease stress. This may help lower your blood pressure. Learn ways to relax, such as deep breathing or listening to music.      •Limit alcohol as directed. Alcohol can increase your blood pressure. A drink of alcohol is 12 ounces of beer, 5 ounces of wine, or 1½ ounces of liquor.      •Do not smoke. Nicotine and other chemicals in cigarettes and cigars can increase your blood pressure and also cause lung damage. Ask your healthcare provider for information if you currently smoke and need help to quit. E-cigarettes or smokeless tobacco still contain nicotine. Talk to your healthcare provider before you use these products.

## 2022-01-19 NOTE — ED ADULT TRIAGE NOTE - CHIEF COMPLAINT QUOTE
had a severe headache, took additional half tab of lisinopril after high blood pressure at home. pt reports numbness to left hand and weakness

## 2022-01-19 NOTE — ED PROVIDER NOTE - CLINICAL SUMMARY MEDICAL DECISION MAKING FREE TEXT BOX
61 year old female with a history of DM, HTN p/w sudden onset sever headache that woke her up from sleep at midnight.  Associated with elevated BP reading at home.  Patient took extra dose BP medication PTA and feels well now. No focal neurologic deficits. Check labs, CXR, EKG, CT head,  Monitor BP.  CTA head/neck, r/o SAH

## 2022-01-19 NOTE — ED ADULT NURSE NOTE - NSIMPLEMENTINTERV_GEN_ALL_ED
Implemented All Universal Safety Interventions:  [place] to call system. Call bell, personal items and telephone within reach. Instruct patient to call for assistance. Room bathroom lighting operational. Non-slip footwear when patient is off stretcher. Physically safe environment: no spills, clutter or unnecessary equipment. Stretcher in lowest position, wheels locked, appropriate side rails in place.

## 2022-01-24 ENCOUNTER — EMERGENCY (EMERGENCY)
Facility: HOSPITAL | Age: 62
LOS: 1 days | Discharge: HOME | End: 2022-01-24
Attending: EMERGENCY MEDICINE | Admitting: EMERGENCY MEDICINE
Payer: MEDICAID

## 2022-01-24 VITALS
HEART RATE: 87 BPM | WEIGHT: 192.02 LBS | SYSTOLIC BLOOD PRESSURE: 179 MMHG | DIASTOLIC BLOOD PRESSURE: 75 MMHG | OXYGEN SATURATION: 99 % | HEIGHT: 64 IN | TEMPERATURE: 97 F | RESPIRATION RATE: 17 BRPM

## 2022-01-24 DIAGNOSIS — N95.9 UNSPECIFIED MENOPAUSAL AND PERIMENOPAUSAL DISORDER: Chronic | ICD-10-CM

## 2022-01-24 DIAGNOSIS — Z98.890 OTHER SPECIFIED POSTPROCEDURAL STATES: Chronic | ICD-10-CM

## 2022-01-24 LAB
APPEARANCE UR: ABNORMAL
BILIRUB UR-MCNC: NEGATIVE — SIGNIFICANT CHANGE UP
COLOR SPEC: ABNORMAL
DIFF PNL FLD: ABNORMAL
EPI CELLS # UR: SIGNIFICANT CHANGE UP
GLUCOSE UR QL: 250
KETONES UR-MCNC: ABNORMAL
LEUKOCYTE ESTERASE UR-ACNC: ABNORMAL
NITRITE UR-MCNC: NEGATIVE — SIGNIFICANT CHANGE UP
PH UR: 7 — SIGNIFICANT CHANGE UP (ref 5–8)
PROT UR-MCNC: 500 MG/DL
RBC CASTS # UR COMP ASSIST: >50 /HPF (ref 0–4)
SP GR SPEC: 1.03 — HIGH (ref 1.01–1.02)
UROBILINOGEN FLD QL: NEGATIVE — SIGNIFICANT CHANGE UP
WBC UR QL: ABNORMAL

## 2022-01-24 PROCEDURE — 99283 EMERGENCY DEPT VISIT LOW MDM: CPT | Mod: 25

## 2022-01-24 PROCEDURE — 87077 CULTURE AEROBIC IDENTIFY: CPT

## 2022-01-24 PROCEDURE — 81001 URINALYSIS AUTO W/SCOPE: CPT

## 2022-01-24 PROCEDURE — 99283 EMERGENCY DEPT VISIT LOW MDM: CPT

## 2022-01-24 PROCEDURE — 87086 URINE CULTURE/COLONY COUNT: CPT

## 2022-01-24 PROCEDURE — 87186 SC STD MICRODIL/AGAR DIL: CPT

## 2022-01-24 RX ORDER — PHENAZOPYRIDINE HCL 100 MG
200 TABLET ORAL ONCE
Refills: 0 | Status: COMPLETED | OUTPATIENT
Start: 2022-01-24 | End: 2022-01-24

## 2022-01-24 RX ORDER — PHENAZOPYRIDINE HCL 100 MG
1 TABLET ORAL
Qty: 6 | Refills: 0
Start: 2022-01-24 | End: 2022-01-25

## 2022-01-24 RX ORDER — AZTREONAM 2 G
1 VIAL (EA) INJECTION
Qty: 14 | Refills: 0
Start: 2022-01-24 | End: 2022-01-30

## 2022-01-24 RX ADMIN — Medication 1 TABLET(S): at 01:45

## 2022-01-24 RX ADMIN — Medication 200 MILLIGRAM(S): at 01:45

## 2022-01-24 NOTE — ED ADULT NURSE NOTE - OBJECTIVE STATEMENT
Patient reports pain in the suprapubic area, urinary freq, and hematuria. Onset tonight. Denies N/V, fever and flank pain.

## 2022-01-24 NOTE — ED PROVIDER NOTE - PATIENT PORTAL LINK FT
You can access the FollowMyHealth Patient Portal offered by [facility] by registering at the following website: [email or web address]. By joining [employer]’s FollowMyHealth portal, you will also be able to view your health information using other applications (apps) compatible with our system.

## 2022-01-24 NOTE — ED ADULT NURSE NOTE - NSIMPLEMENTINTERV_GEN_ALL_ED
Implemented All Universal Safety Interventions:  [place] to call system. Call bell, personal items and telephone within reach. Instruct patient to call for assistance. Room bathroom lighting operational. Non-slip footwear when patient is off stretcher. Physically safe environment: no spills, clutter or unnecessary equipment. Stretcher in lowest position, wheels locked, appropriate side rails in place.

## 2022-01-24 NOTE — ED PROVIDER NOTE - NSFOLLOWUPINSTRUCTIONS_ED_ALL_ED_FT
Urinary Tract Infection in Women    WHAT YOU NEED TO KNOW:    A urinary tract infection (UTI) is caused by bacteria that get inside your urinary tract. Most bacteria that enter your urinary tract come out when you urinate. If the bacteria stay in your urinary tract, you may get an infection. Your urinary tract includes your kidneys, ureters, bladder, and urethra. Urine is made in your kidneys, and it flows from the ureters to the bladder. Urine leaves the bladder through the urethra. A UTI is more common in your lower urinary tract, which includes your bladder and urethra.     Female Urinary System         DISCHARGE INSTRUCTIONS:    Return to the emergency department if:   •You are urinating very little or not at all.      •You have a high fever with shaking chills.       •You have side or back pain that gets worse.      Call your doctor if:   •You have a fever.      •You do not feel better after 2 days of taking antibiotics.      •You are vomiting.       •You have questions or concerns about your condition or care.      Medicines:   •Antibiotics help fight a bacterial infection. If you have UTIs often (called recurrent UTIs), you may be given antibiotics to take regularly. You will be given directions for when and how to use antibiotics. The goal is to prevent UTIs but not cause antibiotic resistance by using antibiotics too often.      •Medicines may be given to decrease pain and burning when you urinate. They will also help decrease the feeling that you need to urinate often. These medicines will make your urine orange or red.      •Take your medicine as directed. Contact your healthcare provider if you think your medicine is not helping or if you have side effects. Tell him or her if you are allergic to any medicine. Keep a list of the medicines, vitamins, and herbs you take. Include the amounts, and when and why you take them. Bring the list or the pill bottles to follow-up visits. Carry your medicine list with you in case of an emergency.      Follow up with your doctor as directed: Write down your questions so you remember to ask them during your visits.     Prevent another UTI:   •Empty your bladder often. Urinate and empty your bladder as soon as you feel the need. Do not hold your urine for long periods of time.      •Wipe from front to back after you urinate or have a bowel movement. This will help prevent germs from getting into your urinary tract through your urethra.      •Drink liquids as directed. Ask how much liquid to drink each day and which liquids are best for you. You may need to drink more liquids than usual to help flush out the bacteria. Do not drink alcohol, caffeine, or citrus juices. These can irritate your bladder and increase your symptoms. Your healthcare provider may recommend cranberry juice to help prevent a UTI.      •Urinate after you have sex. This can help flush out bacteria passed during sex.      •Do not douche or use feminine deodorants. These can change the chemical balance in your vagina.      •Change sanitary pads or tampons often. This will help prevent germs from getting into your urinary tract.       •Talk to your healthcare provider about your birth control method. You may need to change your method if it is increasing your risk for UTIs.      •Wear cotton underwear and clothes that are loose. Tight pants and nylon underwear can trap moisture and cause bacteria to grow.      •Vaginal estrogen may be recommended. This medicine helps prevent UTIs in women who have gone through menopause or are in meek-menopause.      •Do pelvic muscle exercises often. Pelvic muscle exercises may help you start and stop urinating. Strong pelvic muscles may help you empty your bladder easier. Squeeze these muscles tightly for 5 seconds like you are trying to hold back urine. Then relax for 5 seconds. Gradually work up to squeezing for 10 seconds. Do 3 sets of 15 repetitions a day, or as directed.         © Copyright [employer] 2022

## 2022-04-16 RX ORDER — LEVOTHYROXINE SODIUM 125 MCG
1 TABLET ORAL
Qty: 0 | Refills: 0 | DISCHARGE

## 2022-04-16 RX ORDER — ATENOLOL 25 MG/1
0 TABLET ORAL
Qty: 0 | Refills: 0 | DISCHARGE

## 2022-04-16 RX ORDER — OMEPRAZOLE 10 MG/1
0 CAPSULE, DELAYED RELEASE ORAL
Qty: 0 | Refills: 0 | DISCHARGE

## 2022-04-16 RX ORDER — SIMVASTATIN 20 MG/1
0 TABLET, FILM COATED ORAL
Qty: 0 | Refills: 0 | DISCHARGE

## 2022-04-16 RX ORDER — SIMVASTATIN 20 MG/1
1 TABLET, FILM COATED ORAL
Qty: 0 | Refills: 0 | DISCHARGE

## 2022-04-16 RX ORDER — METFORMIN HYDROCHLORIDE 850 MG/1
1 TABLET ORAL
Qty: 0 | Refills: 0 | DISCHARGE

## 2022-04-16 RX ORDER — LEVOTHYROXINE SODIUM 125 MCG
0 TABLET ORAL
Qty: 0 | Refills: 0 | DISCHARGE

## 2022-04-16 RX ORDER — METFORMIN HYDROCHLORIDE 850 MG/1
0 TABLET ORAL
Qty: 0 | Refills: 0 | DISCHARGE

## 2022-04-16 RX ORDER — ERTUGLIFLOZIN 5 MG/1
1 TABLET, FILM COATED ORAL
Qty: 0 | Refills: 0 | DISCHARGE

## 2022-04-16 RX ORDER — LORATADINE 10 MG/1
1 TABLET ORAL
Qty: 0 | Refills: 0 | DISCHARGE

## 2022-04-16 RX ORDER — ERTUGLIFLOZIN 5 MG/1
0 TABLET, FILM COATED ORAL
Qty: 0 | Refills: 0 | DISCHARGE

## 2022-04-16 RX ORDER — LISINOPRIL 2.5 MG/1
1 TABLET ORAL
Qty: 0 | Refills: 0 | DISCHARGE

## 2022-04-17 PROBLEM — E66.9 OBESITY, UNSPECIFIED: Chronic | Status: ACTIVE | Noted: 2021-07-15

## 2022-06-22 NOTE — ED ADULT NURSE NOTE - NSFALLRSKINDICATORS_ED_ALL_ED
Patient has met discharge criteria. VSS. Tolerated diet. No complaints of pain. Dressing CDI. AVS reviewed with patient an family. All questions answered at this time. Patient ambulating at baseline and left department with family member. Wheelchair offered but pt unable to wait, pt left department with wife.
no

## 2022-10-31 NOTE — CONSULT NOTE ADULT - ATTENDING COMMENTS
Chart reviewed    Patient seen and examined    Agree with plan as outlined above    Assessment/Plan:   59 y/o F with non-obstructive CAD, HTN, HLD, T2DM presented initially to the ED 2 days ago after a syncopal episode at home.  iAt that time, she was fasting and was on her 8th day.  Had dinner and was feeding her grandson, found to have fell on the floor hitting her head and right side.  Denies Hx of syncope or seizure disorder.  Denies any prodrome.  While she was in the ED, she reported that she has been having persistent chest heaviness with pain on her left upper back that started 2 days prior to the fall.  All w/u were negative and was allowed to go home with her insistence.  Today, she presented to the ED c/o weakness, SOB, and chest heaviness again.    Syncope s/p Fall  - Has no know syncope or seizure disorder  - No evidence of hypotension or bradycardia  - No acute ischemic changes on EKG  - She has a known cardiac murmur and follows with Dr. Cindy Tapia, though, no TTE recently.  Per patient, her last TTE was about 15 years ago  - She has an audible murmur on exam of at least mild-moderate in significance  - Follow up TTE  - Please, obtain orthostatic VS daily  - Monitor on tele to assess for any arrhythmias  - CT head negative.  Recommend Neuro eval    CAD/Unstable Angina  - Admits to have had cardiac cath 2 years ago with no intervention  - Has been having chest heaviness radiating to her left upper back, SOB/DAVILA  - Her CE has been negative since she was her 2 days ago.  Would cycle x 1 more  - Continue ASA and statin  - Will likely need cardiac cath next week if agreeable    HTN  - Stable and controlled.  Not on home anti-HTN med  - Will not start now  - If needed, will start low dose ARB
---

## 2023-01-04 ENCOUNTER — EMERGENCY (EMERGENCY)
Facility: HOSPITAL | Age: 63
LOS: 1 days | Discharge: HOME | End: 2023-01-04
Attending: EMERGENCY MEDICINE | Admitting: EMERGENCY MEDICINE
Payer: MEDICAID

## 2023-01-04 VITALS
HEART RATE: 75 BPM | OXYGEN SATURATION: 98 % | TEMPERATURE: 98 F | DIASTOLIC BLOOD PRESSURE: 72 MMHG | SYSTOLIC BLOOD PRESSURE: 118 MMHG | RESPIRATION RATE: 17 BRPM

## 2023-01-04 VITALS
TEMPERATURE: 98 F | SYSTOLIC BLOOD PRESSURE: 116 MMHG | HEIGHT: 64 IN | HEART RATE: 78 BPM | RESPIRATION RATE: 17 BRPM | WEIGHT: 190.04 LBS | DIASTOLIC BLOOD PRESSURE: 75 MMHG | OXYGEN SATURATION: 98 %

## 2023-01-04 DIAGNOSIS — Z98.890 OTHER SPECIFIED POSTPROCEDURAL STATES: Chronic | ICD-10-CM

## 2023-01-04 DIAGNOSIS — N95.9 UNSPECIFIED MENOPAUSAL AND PERIMENOPAUSAL DISORDER: Chronic | ICD-10-CM

## 2023-01-04 PROBLEM — E78.00 PURE HYPERCHOLESTEROLEMIA, UNSPECIFIED: Chronic | Status: ACTIVE | Noted: 2021-04-22

## 2023-01-04 PROBLEM — J30.2 OTHER SEASONAL ALLERGIC RHINITIS: Chronic | Status: ACTIVE | Noted: 2021-04-22

## 2023-01-04 PROBLEM — E11.9 TYPE 2 DIABETES MELLITUS WITHOUT COMPLICATIONS: Chronic | Status: ACTIVE | Noted: 2021-04-22

## 2023-01-04 LAB
ALBUMIN SERPL ELPH-MCNC: 3.4 G/DL — SIGNIFICANT CHANGE UP (ref 3.3–5)
ALP SERPL-CCNC: 98 U/L — SIGNIFICANT CHANGE UP (ref 40–120)
ALT FLD-CCNC: 21 U/L — SIGNIFICANT CHANGE UP (ref 12–78)
ANION GAP SERPL CALC-SCNC: 9 MMOL/L — SIGNIFICANT CHANGE UP (ref 5–17)
AST SERPL-CCNC: 12 U/L — LOW (ref 15–37)
BASOPHILS # BLD AUTO: 0.05 K/UL — SIGNIFICANT CHANGE UP (ref 0–0.2)
BASOPHILS NFR BLD AUTO: 0.5 % — SIGNIFICANT CHANGE UP (ref 0–2)
BILIRUB SERPL-MCNC: 0.3 MG/DL — SIGNIFICANT CHANGE UP (ref 0.2–1.2)
BUN SERPL-MCNC: 17 MG/DL — SIGNIFICANT CHANGE UP (ref 7–23)
CALCIUM SERPL-MCNC: 9.3 MG/DL — SIGNIFICANT CHANGE UP (ref 8.5–10.1)
CHLORIDE SERPL-SCNC: 107 MMOL/L — SIGNIFICANT CHANGE UP (ref 96–108)
CO2 SERPL-SCNC: 25 MMOL/L — SIGNIFICANT CHANGE UP (ref 22–31)
CREAT SERPL-MCNC: 0.87 MG/DL — SIGNIFICANT CHANGE UP (ref 0.5–1.3)
D DIMER BLD IA.RAPID-MCNC: <150 NG/ML DDU — SIGNIFICANT CHANGE UP
EGFR: 76 ML/MIN/1.73M2 — SIGNIFICANT CHANGE UP
EOSINOPHIL # BLD AUTO: 0.12 K/UL — SIGNIFICANT CHANGE UP (ref 0–0.5)
EOSINOPHIL NFR BLD AUTO: 1.1 % — SIGNIFICANT CHANGE UP (ref 0–6)
GLUCOSE SERPL-MCNC: 150 MG/DL — HIGH (ref 70–99)
HCT VFR BLD CALC: 34.2 % — LOW (ref 34.5–45)
HGB BLD-MCNC: 10.2 G/DL — LOW (ref 11.5–15.5)
IMM GRANULOCYTES NFR BLD AUTO: 0.5 % — SIGNIFICANT CHANGE UP (ref 0–0.9)
LYMPHOCYTES # BLD AUTO: 1.72 K/UL — SIGNIFICANT CHANGE UP (ref 1–3.3)
LYMPHOCYTES # BLD AUTO: 15.6 % — SIGNIFICANT CHANGE UP (ref 13–44)
MAGNESIUM SERPL-MCNC: 1.7 MG/DL — SIGNIFICANT CHANGE UP (ref 1.6–2.6)
MCHC RBC-ENTMCNC: 23.7 PG — LOW (ref 27–34)
MCHC RBC-ENTMCNC: 29.8 GM/DL — LOW (ref 32–36)
MCV RBC AUTO: 79.5 FL — LOW (ref 80–100)
MONOCYTES # BLD AUTO: 0.52 K/UL — SIGNIFICANT CHANGE UP (ref 0–0.9)
MONOCYTES NFR BLD AUTO: 4.7 % — SIGNIFICANT CHANGE UP (ref 2–14)
NEUTROPHILS # BLD AUTO: 8.59 K/UL — HIGH (ref 1.8–7.4)
NEUTROPHILS NFR BLD AUTO: 77.6 % — HIGH (ref 43–77)
NRBC # BLD: 0 /100 WBCS — SIGNIFICANT CHANGE UP (ref 0–0)
PHOSPHATE SERPL-MCNC: 2.9 MG/DL — SIGNIFICANT CHANGE UP (ref 2.5–4.5)
PLATELET # BLD AUTO: 263 K/UL — SIGNIFICANT CHANGE UP (ref 150–400)
POTASSIUM SERPL-MCNC: 4.1 MMOL/L — SIGNIFICANT CHANGE UP (ref 3.5–5.3)
POTASSIUM SERPL-SCNC: 4.1 MMOL/L — SIGNIFICANT CHANGE UP (ref 3.5–5.3)
PROT SERPL-MCNC: 7.5 G/DL — SIGNIFICANT CHANGE UP (ref 6–8.3)
RBC # BLD: 4.3 M/UL — SIGNIFICANT CHANGE UP (ref 3.8–5.2)
RBC # FLD: 17.9 % — HIGH (ref 10.3–14.5)
SODIUM SERPL-SCNC: 141 MMOL/L — SIGNIFICANT CHANGE UP (ref 135–145)
TROPONIN I, HIGH SENSITIVITY RESULT: 11.2 NG/L — SIGNIFICANT CHANGE UP
WBC # BLD: 11.05 K/UL — HIGH (ref 3.8–10.5)
WBC # FLD AUTO: 11.05 K/UL — HIGH (ref 3.8–10.5)

## 2023-01-04 PROCEDURE — 93010 ELECTROCARDIOGRAM REPORT: CPT

## 2023-01-04 PROCEDURE — 99285 EMERGENCY DEPT VISIT HI MDM: CPT | Mod: 25

## 2023-01-04 PROCEDURE — 84100 ASSAY OF PHOSPHORUS: CPT

## 2023-01-04 PROCEDURE — 96374 THER/PROPH/DIAG INJ IV PUSH: CPT

## 2023-01-04 PROCEDURE — 93005 ELECTROCARDIOGRAM TRACING: CPT

## 2023-01-04 PROCEDURE — 72125 CT NECK SPINE W/O DYE: CPT | Mod: 26,MA

## 2023-01-04 PROCEDURE — 70450 CT HEAD/BRAIN W/O DYE: CPT | Mod: MA

## 2023-01-04 PROCEDURE — 71045 X-RAY EXAM CHEST 1 VIEW: CPT

## 2023-01-04 PROCEDURE — 83735 ASSAY OF MAGNESIUM: CPT

## 2023-01-04 PROCEDURE — 72125 CT NECK SPINE W/O DYE: CPT | Mod: MA

## 2023-01-04 PROCEDURE — 71045 X-RAY EXAM CHEST 1 VIEW: CPT | Mod: 26

## 2023-01-04 PROCEDURE — 36415 COLL VENOUS BLD VENIPUNCTURE: CPT

## 2023-01-04 PROCEDURE — 85379 FIBRIN DEGRADATION QUANT: CPT

## 2023-01-04 PROCEDURE — 70450 CT HEAD/BRAIN W/O DYE: CPT | Mod: 26,MA

## 2023-01-04 PROCEDURE — 82962 GLUCOSE BLOOD TEST: CPT

## 2023-01-04 PROCEDURE — 85025 COMPLETE CBC W/AUTO DIFF WBC: CPT

## 2023-01-04 PROCEDURE — 99285 EMERGENCY DEPT VISIT HI MDM: CPT

## 2023-01-04 PROCEDURE — 84484 ASSAY OF TROPONIN QUANT: CPT

## 2023-01-04 PROCEDURE — 80053 COMPREHEN METABOLIC PANEL: CPT

## 2023-01-04 RX ORDER — SODIUM CHLORIDE 9 MG/ML
1000 INJECTION INTRAMUSCULAR; INTRAVENOUS; SUBCUTANEOUS ONCE
Refills: 0 | Status: COMPLETED | OUTPATIENT
Start: 2023-01-04 | End: 2023-01-04

## 2023-01-04 RX ORDER — ACETAMINOPHEN 500 MG
1000 TABLET ORAL ONCE
Refills: 0 | Status: COMPLETED | OUTPATIENT
Start: 2023-01-04 | End: 2023-01-04

## 2023-01-04 RX ORDER — ATENOLOL 25 MG/1
1 TABLET ORAL
Qty: 0 | Refills: 0 | DISCHARGE

## 2023-01-04 RX ORDER — ALBUTEROL 90 UG/1
2 AEROSOL, METERED ORAL
Qty: 0 | Refills: 0 | DISCHARGE

## 2023-01-04 RX ORDER — ASPIRIN/CALCIUM CARB/MAGNESIUM 324 MG
1 TABLET ORAL
Qty: 0 | Refills: 0 | DISCHARGE

## 2023-01-04 RX ADMIN — SODIUM CHLORIDE 1000 MILLILITER(S): 9 INJECTION INTRAMUSCULAR; INTRAVENOUS; SUBCUTANEOUS at 15:58

## 2023-01-04 RX ADMIN — Medication 400 MILLIGRAM(S): at 16:11

## 2023-01-04 NOTE — ED PROVIDER NOTE - PATIENT PORTAL LINK FT
You can access the FollowMyHealth Patient Portal offered by [facility] by registering at the following website: [email or web address]. By joining [employer]’s FollowMyHealth portal, you will also be able to view your health information using other applications (apps) compatible with our system.

## 2023-01-04 NOTE — ED ADULT NURSE NOTE - NSICDXPASTSURGICALHX_GEN_ALL_CORE_FT
PAST SURGICAL HISTORY:  H/O lumpectomy 2007    Post menopausal problems bleeding post menopause  endometrial thickening, biopsy was normal  s/p D&C    S/P breast lumpectomy

## 2023-01-04 NOTE — ED PROVIDER NOTE - CLINICAL SUMMARY MEDICAL DECISION MAKING FREE TEXT BOX
62-year-old female with syncopal episode most likely vasovagal secondary to fasting will check cardiac labs, CT head rule out intracranial pathology, IV fluids check orthostatics and check electrolytes.

## 2023-01-04 NOTE — ED ADULT NURSE NOTE - CHIEF COMPLAINT QUOTE
Patient presents via EMS today from physical therapy office with c/o near syncope, dizziness, and vomiting. Patient reports she does not remember what happened but she did not fully lose consciousness. Patient reports she was fasting for a blood test this morning. Patient reports this also happened when she fasted last year.  in triage.
normal (ped)...

## 2023-01-04 NOTE — ED ADULT NURSE NOTE - NSICDXPASTMEDICALHX_GEN_ALL_CORE_FT
PAST MEDICAL HISTORY:  Carotid stenosis     Diabetes non insulin dependent    Diabetes mellitus     High cholesterol     HLD (hyperlipidemia)     Hypertension     Hypothyroid     Obesity     Seasonal allergies

## 2023-01-04 NOTE — ED ADULT NURSE NOTE - NSICDXFAMILYHX_GEN_ALL_CORE_FT
FAMILY HISTORY:  FH: lung cancer, father    Uncle  Still living? No  Family history of heart disease, Age at diagnosis: Age Unknown

## 2023-01-04 NOTE — ED PROVIDER NOTE - NSDCPRINTRESULTS_ED_ALL_ED
Refill request for guanFACINE (TENEX) 1 MG tablet    Tenex last filled 6/21/18 for 180# 0RF. Pt takes BID.    Last med check 5/16/18. Pt due to return in six months (11/2018)      
Patient requests all Lab, Cardiology, and Radiology Results on their Discharge Instructions

## 2023-01-04 NOTE — ED ADULT TRIAGE NOTE - CHIEF COMPLAINT QUOTE
Patient presents via EMS today from physical therapy office with c/o near syncope, dizziness, and vomiting. Patient reports she does not remember what happened but she did not fully lose consciousness. Patient reports she was fasting for a blood test this morning. Patient reports this also happened when she fasted last year.  in triage.

## 2023-09-13 NOTE — DISCHARGE NOTE PROVIDER - CARE PROVIDER_API CALL
Patient is in the supine position.
Prepped: right abdomen. Prepped with: ChloraPrep. The patient was draped.
Rajat Washington)  Cardiovascular Disease  [address] [place] [address]  Phone: [phone]  Fax: [phone]  Follow Up Time:

## 2024-02-08 NOTE — H&P ADULT - NSHPSOURCEINFORD_GEN_ALL_CORE
Advance Care Planning   Ambulatory ACP Specialist Patient Outreach    Date:  2/8/2024    ACP Specialist:  Radha Love    Outreach call to patient in follow-up to ACP Specialist referral from: Nj Paz MD    [x] PCP  [] Provider   [] Ambulatory Care Management [] Other     For:                  [x] Advance Directive Assistance              [] Complete Portable DNR order              [] Complete POST/POLST/MOST              [] Code Status Discussion             [] Discuss Goals of Care             [] Early ACP Decision-Making              [] Other (Specify)    Date Referral Received: 2/7/2024    Next Step:   [x] ACP scheduled conversation  [] Outreach again in one week               [x] Email / Mail ACP Info Sheets  [x] Email / Mail Advance Directive   [] Closing referral.  Routing closure to referring provider/staff and to ACP Specialist .    [] Closure letter mailed to patient with invitation to contact ACP Specialist if / when ready.   [] Other (Specify here):         [x] At this time, Healthcare Decision Maker Is:    Advance Care Planning   Healthcare Decision Maker:    Primary Decision Maker: Shon Alicia [facility] - [phone]      [] Primary agent named in scanned advance directive.    [x] Legal Next of Kin.     [] Unable to determine legal decision maker at this time.       Outreaches:       [x] 1st -  Date:  2/8/2024               Intervention:  [x] Spoke with Patient   [] Left Voice mail [] Email / Mail    [] Smailexhart  [] Other (Specify) :     Outcomes:  Writer attempted ACP outreach to the one number listed for both, patient's home and mobile ([phone]) - spoke to patient.  Patient is agreeable to a conversation with ACP Specialist China Felix on Friday 2/9/2024 at 1:00 PM.  A copy of [place] [employer] forms and ACP information sheets sent to patient's confirmed email address on file with ACP Specialist and Coordinator's contact information included.           Thank you for this  Patient

## 2024-02-14 NOTE — ED PROVIDER NOTE - CROS ED GU ALL NEG
Message left for Claudette advising Dr. Ramirez has ordered escitalopram 5 mg for Jenny for her depression. Advised foam dressing has been ordered from [facility].   negative...

## 2024-03-07 ENCOUNTER — APPOINTMENT (OUTPATIENT)
Dept: OTOLARYNGOLOGY | Facility: CLINIC | Age: 64
End: 2024-03-07
Payer: MEDICAID

## 2024-03-07 VITALS
HEIGHT: 64 IN | DIASTOLIC BLOOD PRESSURE: 83 MMHG | BODY MASS INDEX: 30.9 KG/M2 | WEIGHT: 181 LBS | SYSTOLIC BLOOD PRESSURE: 147 MMHG

## 2024-03-07 DIAGNOSIS — H61.23 IMPACTED CERUMEN, BILATERAL: ICD-10-CM

## 2024-03-07 DIAGNOSIS — H90.3 SENSORINEURAL HEARING LOSS, BILATERAL: ICD-10-CM

## 2024-03-07 DIAGNOSIS — H93.8X3 OTHER SPECIFIED DISORDERS OF EAR, BILATERAL: ICD-10-CM

## 2024-03-07 PROCEDURE — 92504 EAR MICROSCOPY EXAMINATION: CPT

## 2024-03-07 PROCEDURE — 92567 TYMPANOMETRY: CPT

## 2024-03-07 PROCEDURE — 92557 COMPREHENSIVE HEARING TEST: CPT

## 2024-03-07 PROCEDURE — 99203 OFFICE O/P NEW LOW 30 MIN: CPT

## 2024-03-07 NOTE — PHYSICAL EXAM
[Binocular Microscopic Exam] : Binocular microscopic exam was performed [Normal] : the left mastoid was normal [Hearing Loss Right Only] : normal [FreeTextEntry8] : mel [Hearing Loss Left Only] : normal [FreeTextEntry9] : mel

## 2024-03-07 NOTE — HISTORY OF PRESENT ILLNESS
[de-identified] : 63 year old woman presents for initial evaluation of ear fullness was sudden when traveling to [place] and bilateral was told has cerumen no otorrhea, otalgia no otologic hx

## 2024-03-07 NOTE — REVIEW OF SYSTEMS
[As Noted in HPI] : as noted in HPI Quality 431: Preventive Care And Screening: Unhealthy Alcohol Use - Screening: Patient screened for unhealthy alcohol use using a single question and scores less than 2 times per year [Negative] : Heme/Lymph Detail Level: Detailed Quality 226: Preventive Care And Screening: Tobacco Use: Screening And Cessation Intervention: Patient screened for tobacco use and is an ex/non-smoker

## 2024-03-07 NOTE — PROCEDURE
[] : Binocular Microscopy [Same] : same as the Pre Op Dx. [FreeTextEntry4] : none [FreeTextEntry6] : Operative microscope was used to examine the ear canal, ear drum and visible middle ear landmarks. Adequate exam would not have been possible without the use of a microscope. Findings are described.

## 2024-03-07 NOTE — DATA REVIEWED
[de-identified] : An audiogram was ordered and performed including pure tones, tympanometry, and speech testing for the patients complaint of ear fullness I have independently reviewed the patient's audiogram from today and my findings include genoveva SNHL

## 2024-08-02 NOTE — ED ADULT TRIAGE NOTE - CHIEF COMPLAINT QUOTE
Detail Level: Generalized
Detail Level: Zone
Patient c/o severe headache , back pain and temp of 100.0  since Saturday. FS = 146

## 2025-01-14 ENCOUNTER — NON-APPOINTMENT (OUTPATIENT)
Age: 65
End: 2025-01-14

## 2025-06-06 ENCOUNTER — EMERGENCY (EMERGENCY)
Facility: HOSPITAL | Age: 65
LOS: 1 days | End: 2025-06-06
Attending: INTERNAL MEDICINE | Admitting: INTERNAL MEDICINE
Payer: COMMERCIAL

## 2025-06-06 VITALS
DIASTOLIC BLOOD PRESSURE: 81 MMHG | WEIGHT: 173.06 LBS | RESPIRATION RATE: 20 BRPM | SYSTOLIC BLOOD PRESSURE: 130 MMHG | HEIGHT: 64 IN | OXYGEN SATURATION: 97 % | TEMPERATURE: 98 F | HEART RATE: 93 BPM

## 2025-06-06 VITALS
TEMPERATURE: 98 F | RESPIRATION RATE: 18 BRPM | SYSTOLIC BLOOD PRESSURE: 136 MMHG | HEART RATE: 88 BPM | DIASTOLIC BLOOD PRESSURE: 76 MMHG | OXYGEN SATURATION: 96 %

## 2025-06-06 DIAGNOSIS — Z98.890 OTHER SPECIFIED POSTPROCEDURAL STATES: Chronic | ICD-10-CM

## 2025-06-06 DIAGNOSIS — N95.9 UNSPECIFIED MENOPAUSAL AND PERIMENOPAUSAL DISORDER: Chronic | ICD-10-CM

## 2025-06-06 LAB
FLUAV AG NPH QL: SIGNIFICANT CHANGE UP
FLUBV AG NPH QL: SIGNIFICANT CHANGE UP
RSV RNA NPH QL NAA+NON-PROBE: SIGNIFICANT CHANGE UP
SARS-COV-2 RNA SPEC QL NAA+PROBE: SIGNIFICANT CHANGE UP
SOURCE RESPIRATORY: SIGNIFICANT CHANGE UP

## 2025-06-06 PROCEDURE — 99284 EMERGENCY DEPT VISIT MOD MDM: CPT

## 2025-06-06 PROCEDURE — 99284 EMERGENCY DEPT VISIT MOD MDM: CPT | Mod: 25

## 2025-06-06 PROCEDURE — 71046 X-RAY EXAM CHEST 2 VIEWS: CPT | Mod: 26

## 2025-06-06 PROCEDURE — 87637 SARSCOV2&INF A&B&RSV AMP PRB: CPT

## 2025-06-06 PROCEDURE — 71046 X-RAY EXAM CHEST 2 VIEWS: CPT

## 2025-06-06 RX ORDER — AZITHROMYCIN 250 MG
1 CAPSULE ORAL
Qty: 6 | Refills: 0
Start: 2025-06-06 | End: 2025-06-10

## 2025-06-06 RX ORDER — AZITHROMYCIN 250 MG
0 CAPSULE ORAL
Refills: 0
Start: 2025-06-06

## 2025-06-06 RX ORDER — BENZONATATE 100 MG
200 CAPSULE ORAL ONCE
Refills: 0 | Status: COMPLETED | OUTPATIENT
Start: 2025-06-06 | End: 2025-06-06

## 2025-06-06 RX ORDER — PREDNISONE 20 MG/1
2 TABLET ORAL
Qty: 8 | Refills: 0
Start: 2025-06-06 | End: 2025-06-09

## 2025-06-06 RX ORDER — BENZONATATE 100 MG
1 CAPSULE ORAL
Qty: 10 | Refills: 0
Start: 2025-06-06 | End: 2025-06-10

## 2025-06-06 RX ORDER — PREDNISONE 20 MG/1
50 TABLET ORAL ONCE
Refills: 0 | Status: COMPLETED | OUTPATIENT
Start: 2025-06-06 | End: 2025-06-06

## 2025-06-06 RX ADMIN — Medication 200 MILLIGRAM(S): at 20:21

## 2025-06-06 RX ADMIN — PREDNISONE 50 MILLIGRAM(S): 20 TABLET ORAL at 20:21

## 2025-06-06 NOTE — ED ADULT TRIAGE NOTE - CHIEF COMPLAINT QUOTE
64 yr old female arrives to ED c/o cough, no fevers or chills, Pt notes to have been seen at urgent care and instructed to be seen in ED for worsening symptoms, Pt also admits to weakness, pt denies fever chills, chest pain or sob at this time. Swathi SPARKS

## 2025-06-06 NOTE — ED ADULT NURSE REASSESSMENT NOTE - NSFALLUNIVINTERV_ED_ALL_ED
Bed/Stretcher in lowest position, wheels locked, appropriate side rails in place/Call bell, personal items and telephone in reach/Instruct patient to call for assistance before getting out of bed/chair/stretcher/Non-slip footwear applied when patient is off stretcher/[place] to call system/Physically safe environment - no spills, clutter or unnecessary equipment/Purposeful proactive rounding/Room/bathroom lighting operational, light cord in reach

## 2025-06-06 NOTE — ED PROVIDER NOTE - PROGRESS NOTE DETAILS
pt with a good ambulating o2 above 96%.  All questions were answered. Discussed the importance of prompt, close medical follow-up. Patient will return with any changes, concerns or persistent/worsening symptoms.  Patient verbalized understanding.

## 2025-06-06 NOTE — ED PROVIDER NOTE - CARE PROVIDER_API CALL
Nate Chaney  Pulmonary Disease  [address] [place] [id number]  Phone: [phone]  Fax: [phone]  Follow Up Time: 1-3 Days

## 2025-06-06 NOTE — ED PROVIDER NOTE - NSFOLLOWUPINSTRUCTIONS_ED_ALL_ED_FT
Follow up with Pulmonology. Return for chest pain, fever, shortness of breath, bloody cough, worsening condition.    Steroids, antibiotic, and cough medication was sent to your pharmacy.    Acute Cough    WHAT YOU NEED TO KNOW:    An acute cough can last up to 3 weeks. Common causes of an acute cough include a cold, allergies, or a lung infection.    DISCHARGE INSTRUCTIONS:    Return to the emergency department if:    You have trouble breathing or feel short of breath.    You cough up blood, or you see blood in your mucus.    You faint or feel weak or dizzy.    You have chest pain when you cough or take a deep breath.    You have new wheezing.  Contact your healthcare provider if:    You have a fever.    Your cough lasts longer than 4 weeks.    Your symptoms do not improve with treatment.    You have questions or concerns about your condition or care.  Medicines:    Medicines may be needed to stop the cough, decrease swelling in your airways, or help open your airways. Medicine may also be given to help you cough up mucus. Ask your healthcare provider what over-the-counter medicines you can take. If you have an infection caused by bacteria, you may need antibiotics.    Take your medicine as directed. Contact your healthcare provider if you think your medicine is not helping or if you have side effects. Tell your provider if you are allergic to any medicine. Keep a list of the medicines, vitamins, and herbs you take. Include the amounts, and when and why you take them. Bring the list or the pill bottles to follow-up visits. Carry your medicine list with you in case of an emergency.  Manage your symptoms:    Do not smoke and stay away from others who smoke. Nicotine and other chemicals in cigarettes and cigars can cause lung damage and make your cough worse. Ask your healthcare provider for information if you currently smoke and need help to quit. E-cigarettes or smokeless tobacco still contain nicotine. Talk to your healthcare provider before you use these products.    Drink extra liquids as directed. Liquids will help thin and loosen mucus so you can cough it up. Liquids will also help prevent dehydration. Examples of good liquids to drink include water, fruit juice, and broth. Do not drink liquids that contain caffeine. Caffeine can increase your risk for dehydration. Ask your healthcare provider how much liquid to drink each day.    Rest as directed. Do not do activities that make your cough worse, such as exercise.    Use a humidifier or vaporizer. Use a cool mist humidifier or a vaporizer to increase air moisture in your home. This may make it easier for you to breathe and help decrease your cough.  Humidifier      Eat 2 to 5 mL of honey 2 times each day. Honey can help thin mucus and decrease your cough.    Use cough drops or lozenges. These can help decrease throat irritation and your cough.  Follow up with your healthcare provider as directed: Write down your questions so you remember to ask them during your visits.    © [employer] 1973, 2025

## 2025-06-06 NOTE — ED PROVIDER NOTE - OBJECTIVE STATEMENT
64-year-old female with past medical history of hypertension, hyperlipidemia, diabetes presents today due to a cough for 4 days.  Patient reports that initially started off as subjective fever and sore throat and then progressed to a cough.  Patient reports that whenever she is having her coughing fits she would feel short of breath in the moment.  Patient denies chest pain, shortness of breath currently, hemoptysis, sick contacts, or any other complaints.

## 2025-06-06 NOTE — ED ADULT NURSE REASSESSMENT NOTE - NS ED NURSE REASSESS COMMENT FT1
Report taken from Katie JOHNSON RN. [facility] any pain at this time. No other signs of distress noted. Plan of care continued.

## 2025-06-06 NOTE — ED PROVIDER NOTE - CLINICAL SUMMARY MEDICAL DECISION MAKING FREE TEXT BOX
64-year-old female with past medical history of hypertension, hyperlipidemia, diabetes presents today due to a cough for 4 days VSS Exam stable CXR normal, Viral panel negative, Rx prednisone, tessalon ans Zithromax, discharged to OP care with referral

## 2025-06-06 NOTE — ED ADULT NURSE NOTE - OBJECTIVE STATEMENT
A/ox4 patient came to ED c/o coughing. Patient states she was seen at urgent care and instructed to come to ed for worsening cough and weakness. Afebrile, no n.v.d, Pending further labs and radiology reports.

## 2025-06-06 NOTE — ED ADULT NURSE NOTE - NSFALLUNIVINTERV_ED_ALL_ED
Bed/Stretcher in lowest position, wheels locked, appropriate side rails in place/Call bell, personal items and telephone in reach/Instruct patient to call for assistance before getting out of bed/chair/stretcher/Non-slip footwear applied when patient is off stretcher/[place] to call system/Physically safe environment - no spills, clutter or unnecessary equipment/Purposeful proactive rounding/Room/bathroom lighting operational, light cord in reach

## 2025-06-06 NOTE — ED PROVIDER NOTE - PATIENT PORTAL LINK FT
You can access the FollowMyHealth Patient Portal offered by [facility] by registering at the following website: [email or web address]. By joining [employer]s FollowMyHealth portal, you will also be able to view your health information using other applications (apps) compatible with our system.